# Patient Record
Sex: FEMALE | Race: BLACK OR AFRICAN AMERICAN | Employment: UNEMPLOYED | URBAN - METROPOLITAN AREA
[De-identification: names, ages, dates, MRNs, and addresses within clinical notes are randomized per-mention and may not be internally consistent; named-entity substitution may affect disease eponyms.]

---

## 2017-01-16 ENCOUNTER — APPOINTMENT (OUTPATIENT)
Dept: CT IMAGING | Facility: HOSPITAL | Age: 27
End: 2017-01-16
Attending: EMERGENCY MEDICINE
Payer: MEDICAID

## 2017-01-16 ENCOUNTER — HOSPITAL ENCOUNTER (EMERGENCY)
Facility: HOSPITAL | Age: 27
Discharge: HOME | End: 2017-01-16
Attending: EMERGENCY MEDICINE
Payer: MEDICAID

## 2017-01-16 VITALS
HEIGHT: 64 IN | WEIGHT: 105.81 LBS | SYSTOLIC BLOOD PRESSURE: 124 MMHG | HEART RATE: 52 BPM | RESPIRATION RATE: 18 BRPM | OXYGEN SATURATION: 100 % | TEMPERATURE: 101 F | BODY MASS INDEX: 18.06 KG/M2 | DIASTOLIC BLOOD PRESSURE: 109 MMHG

## 2017-01-16 DIAGNOSIS — R10.9 ABDOMINAL PAIN, ACUTE: Primary | ICD-10-CM

## 2017-01-16 LAB
ALBUMIN SERPL-MCNC: 4.4 G/DL (ref 3.5–4.8)
ALP LIVER SERPL-CCNC: 64 U/L (ref 37–98)
ALT SERPL-CCNC: 17 U/L (ref 14–54)
AST SERPL-CCNC: 13 U/L (ref 15–41)
BASOPHILS # BLD AUTO: 0.01 X10(3) UL (ref 0–0.1)
BASOPHILS NFR BLD AUTO: 0.1 %
BILIRUB SERPL-MCNC: 1.1 MG/DL (ref 0.1–2)
BILIRUB UR QL STRIP.AUTO: NEGATIVE
BUN BLD-MCNC: 14 MG/DL (ref 8–20)
CALCIUM BLD-MCNC: 9.5 MG/DL (ref 8.3–10.3)
CHLORIDE: 99 MMOL/L (ref 101–111)
CLARITY UR REFRACT.AUTO: CLEAR
CO2: 28 MMOL/L (ref 22–32)
COLOR UR AUTO: YELLOW
CREAT BLD-MCNC: 0.9 MG/DL (ref 0.55–1.02)
EOSINOPHIL # BLD AUTO: 0 X10(3) UL (ref 0–0.3)
EOSINOPHIL NFR BLD AUTO: 0 %
ERYTHROCYTE [DISTWIDTH] IN BLOOD BY AUTOMATED COUNT: 13.5 % (ref 11.5–16)
GLUCOSE BLD-MCNC: 95 MG/DL (ref 70–99)
GLUCOSE UR STRIP.AUTO-MCNC: NEGATIVE MG/DL
HCT VFR BLD AUTO: 41.5 % (ref 34–50)
HGB BLD-MCNC: 14.2 G/DL (ref 12–16)
IMMATURE GRANULOCYTE COUNT: 0.04 X10(3) UL (ref 0–1)
IMMATURE GRANULOCYTE RATIO %: 0.4 %
LEUKOCYTE ESTERASE UR QL STRIP.AUTO: NEGATIVE
LIPASE: 103 U/L (ref 73–393)
LYMPHOCYTES # BLD AUTO: 1.12 X10(3) UL (ref 0.9–4)
LYMPHOCYTES NFR BLD AUTO: 11.7 %
M PROTEIN MFR SERPL ELPH: 8.4 G/DL (ref 6.1–8.3)
MCH RBC QN AUTO: 29.6 PG (ref 27–33.2)
MCHC RBC AUTO-ENTMCNC: 34.2 G/DL (ref 31–37)
MCV RBC AUTO: 86.6 FL (ref 81–100)
MONOCYTES # BLD AUTO: 0.8 X10(3) UL (ref 0.1–0.6)
MONOCYTES NFR BLD AUTO: 8.3 %
NEUTROPHIL ABS PRELIM: 7.63 X10 (3) UL (ref 1.3–6.7)
NEUTROPHILS # BLD AUTO: 7.63 X10(3) UL (ref 1.3–6.7)
NEUTROPHILS NFR BLD AUTO: 79.5 %
NITRITE UR QL STRIP.AUTO: NEGATIVE
PH UR STRIP.AUTO: 6 [PH] (ref 4.5–8)
PLATELET # BLD AUTO: 252 10(3)UL (ref 150–450)
POCT LOT NUMBER: NORMAL
POCT URINE PREGNANCY: NEGATIVE
POTASSIUM SERPL-SCNC: 3.5 MMOL/L (ref 3.6–5.1)
PROT UR STRIP.AUTO-MCNC: 100 MG/DL
RBC # BLD AUTO: 4.79 X10(6)UL (ref 3.8–5.1)
RBC UR QL AUTO: NEGATIVE
RED CELL DISTRIBUTION WIDTH-SD: 42.5 FL (ref 35.1–46.3)
SODIUM SERPL-SCNC: 136 MMOL/L (ref 136–144)
SP GR UR STRIP.AUTO: >1.06 (ref 1–1.03)
UROBILINOGEN UR STRIP.AUTO-MCNC: <2 MG/DL
WBC # BLD AUTO: 9.6 X10(3) UL (ref 4–13)

## 2017-01-16 PROCEDURE — 85025 COMPLETE CBC W/AUTO DIFF WBC: CPT | Performed by: EMERGENCY MEDICINE

## 2017-01-16 PROCEDURE — 96374 THER/PROPH/DIAG INJ IV PUSH: CPT

## 2017-01-16 PROCEDURE — 96375 TX/PRO/DX INJ NEW DRUG ADDON: CPT

## 2017-01-16 PROCEDURE — 80053 COMPREHEN METABOLIC PANEL: CPT | Performed by: EMERGENCY MEDICINE

## 2017-01-16 PROCEDURE — 81001 URINALYSIS AUTO W/SCOPE: CPT | Performed by: EMERGENCY MEDICINE

## 2017-01-16 PROCEDURE — 83690 ASSAY OF LIPASE: CPT | Performed by: EMERGENCY MEDICINE

## 2017-01-16 PROCEDURE — 74178 CT ABD&PLV WO CNTR FLWD CNTR: CPT

## 2017-01-16 PROCEDURE — 96361 HYDRATE IV INFUSION ADD-ON: CPT

## 2017-01-16 PROCEDURE — 81025 URINE PREGNANCY TEST: CPT

## 2017-01-16 PROCEDURE — 99285 EMERGENCY DEPT VISIT HI MDM: CPT

## 2017-01-16 PROCEDURE — 99284 EMERGENCY DEPT VISIT MOD MDM: CPT

## 2017-01-16 RX ORDER — ONDANSETRON 2 MG/ML
INJECTION INTRAMUSCULAR; INTRAVENOUS
Status: DISCONTINUED
Start: 2017-01-16 | End: 2017-01-17

## 2017-01-16 RX ORDER — ONDANSETRON 2 MG/ML
4 INJECTION INTRAMUSCULAR; INTRAVENOUS ONCE
Status: COMPLETED | OUTPATIENT
Start: 2017-01-16 | End: 2017-01-16

## 2017-01-16 RX ORDER — MIDAZOLAM HYDROCHLORIDE 1 MG/ML
2.5 INJECTION INTRAMUSCULAR; INTRAVENOUS ONCE
Status: DISCONTINUED | OUTPATIENT
Start: 2017-01-16 | End: 2017-01-16

## 2017-01-16 RX ORDER — ACETAMINOPHEN 500 MG
TABLET ORAL
Status: DISCONTINUED
Start: 2017-01-16 | End: 2017-01-17

## 2017-01-16 RX ORDER — ONDANSETRON 4 MG/1
4 TABLET, ORALLY DISINTEGRATING ORAL EVERY 4 HOURS PRN
Qty: 10 TABLET | Refills: 0 | Status: SHIPPED | OUTPATIENT
Start: 2017-01-16 | End: 2017-06-05

## 2017-01-16 RX ORDER — ACETAMINOPHEN 500 MG
1000 TABLET ORAL ONCE
Status: COMPLETED | OUTPATIENT
Start: 2017-01-16 | End: 2017-01-16

## 2017-01-16 RX ORDER — SODIUM CHLORIDE 9 MG/ML
1000 INJECTION, SOLUTION INTRAVENOUS ONCE
Status: COMPLETED | OUTPATIENT
Start: 2017-01-16 | End: 2017-01-16

## 2017-01-16 RX ORDER — MORPHINE SULFATE 4 MG/ML
4 INJECTION, SOLUTION INTRAMUSCULAR; INTRAVENOUS ONCE
Status: COMPLETED | OUTPATIENT
Start: 2017-01-16 | End: 2017-01-16

## 2017-01-17 NOTE — ED PROVIDER NOTES
I reviewed the CT scan findings urinalysis with the patient. I feel her vomiting no fever no abdominal discomfort may be related to the infectious etiology likely viral in origin.   Patient serial abdominal exams of the patient's abdomen to be soft nonsurg

## 2017-01-17 NOTE — ED PROVIDER NOTES
Patient Seen in: [facility] Emergency Department    History   Patient presents with:  Abdomen/Flank Pain (GI/)    Stated Complaint: abdominal pain    HPI    18-year-old female presents emergency department has had nausea vomiting and diarrhea for fever.  HEENT: Pupils equal react to light extraocular muscles intact no scleral icterus, mucous membranes are dry, there is no erythema or exudate in the posterior pharynx  Neck: Supple no JVD no lymphadenopathy no meningismus no carotid bruit  CV: Regula along some pain medication and IV fluids.   Am concerned about appendicitis     Ct Abdomen+pelvis(all W+wo)(cpt=74178)    1/16/2017  PROCEDURE:  CT ABDOMEN+PELVIS(ALL W+WO)(CPT=74178)  COMPARISON:  [facility] , CT ABDOMEN PELVIS IV CONTRAST, NO ORAL (ER), 8/24/ Normal.  No visible pulmonary or pleural disease. OTHER:  Negative. 1/16/2017  CONCLUSION:   1. 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 2. The appendix is not identified, however there are no pericecal inflammatory change is noted.     Radha Cid

## 2017-06-05 ENCOUNTER — APPOINTMENT (OUTPATIENT)
Dept: ULTRASOUND IMAGING | Facility: HOSPITAL | Age: 27
End: 2017-06-05
Attending: EMERGENCY MEDICINE
Payer: MEDICAID

## 2017-06-05 ENCOUNTER — APPOINTMENT (OUTPATIENT)
Dept: GENERAL RADIOLOGY | Facility: HOSPITAL | Age: 27
End: 2017-06-05
Attending: EMERGENCY MEDICINE
Payer: MEDICAID

## 2017-06-05 ENCOUNTER — HOSPITAL ENCOUNTER (EMERGENCY)
Facility: HOSPITAL | Age: 27
Discharge: HOME | End: 2017-06-05
Attending: EMERGENCY MEDICINE
Payer: MEDICAID

## 2017-06-05 VITALS
SYSTOLIC BLOOD PRESSURE: 151 MMHG | TEMPERATURE: 98 F | WEIGHT: 105 LBS | BODY MASS INDEX: 17.93 KG/M2 | HEART RATE: 53 BPM | RESPIRATION RATE: 19 BRPM | HEIGHT: 64 IN | DIASTOLIC BLOOD PRESSURE: 98 MMHG | OXYGEN SATURATION: 100 %

## 2017-06-05 DIAGNOSIS — R10.9 ABDOMINAL PAIN OF UNKNOWN ETIOLOGY: Primary | ICD-10-CM

## 2017-06-05 DIAGNOSIS — N94.6 DYSMENORRHEA: ICD-10-CM

## 2017-06-05 PROCEDURE — 85025 COMPLETE CBC W/AUTO DIFF WBC: CPT | Performed by: EMERGENCY MEDICINE

## 2017-06-05 PROCEDURE — 74020 XR ABDOMEN, OBSTRUCTIVE SERIES (CPT=74020): CPT | Performed by: EMERGENCY MEDICINE

## 2017-06-05 PROCEDURE — 81001 URINALYSIS AUTO W/SCOPE: CPT | Performed by: EMERGENCY MEDICINE

## 2017-06-05 PROCEDURE — 76830 TRANSVAGINAL US NON-OB: CPT | Performed by: EMERGENCY MEDICINE

## 2017-06-05 PROCEDURE — 81025 URINE PREGNANCY TEST: CPT

## 2017-06-05 PROCEDURE — 76856 US EXAM PELVIC COMPLETE: CPT | Performed by: EMERGENCY MEDICINE

## 2017-06-05 PROCEDURE — 96376 TX/PRO/DX INJ SAME DRUG ADON: CPT

## 2017-06-05 PROCEDURE — 99284 EMERGENCY DEPT VISIT MOD MDM: CPT

## 2017-06-05 PROCEDURE — 96375 TX/PRO/DX INJ NEW DRUG ADDON: CPT

## 2017-06-05 PROCEDURE — 93975 VASCULAR STUDY: CPT | Performed by: EMERGENCY MEDICINE

## 2017-06-05 PROCEDURE — 96361 HYDRATE IV INFUSION ADD-ON: CPT

## 2017-06-05 PROCEDURE — 99285 EMERGENCY DEPT VISIT HI MDM: CPT

## 2017-06-05 PROCEDURE — 76700 US EXAM ABDOM COMPLETE: CPT | Performed by: EMERGENCY MEDICINE

## 2017-06-05 PROCEDURE — 83690 ASSAY OF LIPASE: CPT | Performed by: EMERGENCY MEDICINE

## 2017-06-05 PROCEDURE — 96374 THER/PROPH/DIAG INJ IV PUSH: CPT

## 2017-06-05 PROCEDURE — 80053 COMPREHEN METABOLIC PANEL: CPT | Performed by: EMERGENCY MEDICINE

## 2017-06-05 RX ORDER — KETOROLAC TROMETHAMINE 30 MG/ML
30 INJECTION, SOLUTION INTRAMUSCULAR; INTRAVENOUS ONCE
Status: DISCONTINUED | OUTPATIENT
Start: 2017-06-05 | End: 2017-06-05

## 2017-06-05 RX ORDER — HYDROMORPHONE HYDROCHLORIDE 1 MG/ML
1 INJECTION, SOLUTION INTRAMUSCULAR; INTRAVENOUS; SUBCUTANEOUS ONCE
Status: COMPLETED | OUTPATIENT
Start: 2017-06-05 | End: 2017-06-05

## 2017-06-05 RX ORDER — ONDANSETRON 2 MG/ML
4 INJECTION INTRAMUSCULAR; INTRAVENOUS ONCE
Status: COMPLETED | OUTPATIENT
Start: 2017-06-05 | End: 2017-06-05

## 2017-06-05 RX ORDER — KETOROLAC TROMETHAMINE 30 MG/ML
15 INJECTION, SOLUTION INTRAMUSCULAR; INTRAVENOUS ONCE
Status: COMPLETED | OUTPATIENT
Start: 2017-06-05 | End: 2017-06-05

## 2017-06-05 RX ORDER — HYDROCODONE BITARTRATE AND ACETAMINOPHEN 5; 325 MG/1; MG/1
1 TABLET ORAL EVERY 6 HOURS PRN
Qty: 20 TABLET | Refills: 0 | Status: SHIPPED | OUTPATIENT
Start: 2017-06-05 | End: 2017-06-17

## 2017-06-05 RX ORDER — HYDROCODONE BITARTRATE AND ACETAMINOPHEN 5; 325 MG/1; MG/1
1-2 TABLET ORAL EVERY 4 HOURS PRN
Qty: 20 TABLET | Refills: 0 | Status: SHIPPED | OUTPATIENT
Start: 2017-06-05 | End: 2017-06-05

## 2017-06-05 NOTE — ED NOTES
Patient very tearful and moaning on cart. Pt sts pain and nausea since Saturday. Unable to recall at this time how many episodes of emesis today.

## 2017-06-05 NOTE — ED INITIAL ASSESSMENT (HPI)
Patient reports severe abdominal pain as well as nausea/vomiting since Friday. Reports she has had ovarian cysts in past. Reports she started her menstrual cycle yesterday which made pain worse. Crying in triage d/t pain.

## 2017-06-06 NOTE — ED PROVIDER NOTES
Patient Seen in: [facility] Emergency Department    History   Patient presents with:  Abdomen/Flank Pain (GI/)    Stated Complaint: Abd pain    HPI    68-year-old complaining of abdominal pain and vomiting the patient states she began administerin cardiovascular exam shows regular rate and rhythm without murmurs abdomen is soft there is mild to moderate generalized tenderness throughout the abdomen there is no masses guarding rebound extremities normal neurologic exams normal.    ED Course     Labs questionable ovarian cyst in the past.  I did an ultrasound of her abdomen which was negative ultrasound of her pelvis showed possible pelvic congestion syndrome.   I advised the patient that if she needs to follow-up with gynecology regarding the symptoms

## 2017-09-01 ENCOUNTER — APPOINTMENT (OUTPATIENT)
Dept: ULTRASOUND IMAGING | Facility: HOSPITAL | Age: 27
End: 2017-09-01
Attending: EMERGENCY MEDICINE
Payer: MEDICAID

## 2017-09-01 ENCOUNTER — HOSPITAL ENCOUNTER (EMERGENCY)
Facility: HOSPITAL | Age: 27
Discharge: HOME | End: 2017-09-01
Attending: EMERGENCY MEDICINE
Payer: MEDICAID

## 2017-09-01 VITALS
OXYGEN SATURATION: 100 % | WEIGHT: 110 LBS | BODY MASS INDEX: 18.78 KG/M2 | HEIGHT: 64 IN | HEART RATE: 53 BPM | TEMPERATURE: 99 F | SYSTOLIC BLOOD PRESSURE: 132 MMHG | DIASTOLIC BLOOD PRESSURE: 87 MMHG | RESPIRATION RATE: 16 BRPM

## 2017-09-01 DIAGNOSIS — N30.90 CYSTITIS: Primary | ICD-10-CM

## 2017-09-01 DIAGNOSIS — R10.9 ABDOMINAL PAIN, ACUTE: ICD-10-CM

## 2017-09-01 DIAGNOSIS — N83.202 CYST OF LEFT OVARY: ICD-10-CM

## 2017-09-01 LAB
ALBUMIN SERPL-MCNC: 4.2 G/DL (ref 3.5–4.8)
ALP LIVER SERPL-CCNC: 58 U/L (ref 37–98)
ALT SERPL-CCNC: 19 U/L (ref 14–54)
AST SERPL-CCNC: 15 U/L (ref 15–41)
BASOPHILS # BLD AUTO: 0.01 X10(3) UL (ref 0–0.1)
BASOPHILS NFR BLD AUTO: 0.1 %
BILIRUB SERPL-MCNC: 0.6 MG/DL (ref 0.1–2)
BILIRUB UR QL STRIP.AUTO: NEGATIVE
BUN BLD-MCNC: 12 MG/DL (ref 8–20)
CALCIUM BLD-MCNC: 10.1 MG/DL (ref 8.3–10.3)
CHLORIDE: 104 MMOL/L (ref 101–111)
CO2: 23 MMOL/L (ref 22–32)
COLOR UR AUTO: YELLOW
CREAT BLD-MCNC: 0.81 MG/DL (ref 0.55–1.02)
EOSINOPHIL # BLD AUTO: 0 X10(3) UL (ref 0–0.3)
EOSINOPHIL NFR BLD AUTO: 0 %
ERYTHROCYTE [DISTWIDTH] IN BLOOD BY AUTOMATED COUNT: 12.7 % (ref 11.5–16)
GLUCOSE BLD-MCNC: 103 MG/DL (ref 70–99)
GLUCOSE UR STRIP.AUTO-MCNC: NEGATIVE MG/DL
HCT VFR BLD AUTO: 40.7 % (ref 34–50)
HGB BLD-MCNC: 14.1 G/DL (ref 12–16)
IMMATURE GRANULOCYTE COUNT: 0.02 X10(3) UL (ref 0–1)
IMMATURE GRANULOCYTE RATIO %: 0.2 %
KETONES UR STRIP.AUTO-MCNC: 80 MG/DL
LEUKOCYTE ESTERASE UR QL STRIP.AUTO: NEGATIVE
LYMPHOCYTES # BLD AUTO: 0.99 X10(3) UL (ref 0.9–4)
LYMPHOCYTES NFR BLD AUTO: 10.2 %
M PROTEIN MFR SERPL ELPH: 8.4 G/DL (ref 6.1–8.3)
MCH RBC QN AUTO: 29.8 PG (ref 27–33.2)
MCHC RBC AUTO-ENTMCNC: 34.6 G/DL (ref 31–37)
MCV RBC AUTO: 86 FL (ref 81–100)
MONOCYTES # BLD AUTO: 1.37 X10(3) UL (ref 0.1–0.6)
MONOCYTES NFR BLD AUTO: 14.1 %
NEUTROPHIL ABS PRELIM: 7.32 X10 (3) UL (ref 1.3–6.7)
NEUTROPHILS # BLD AUTO: 7.32 X10(3) UL (ref 1.3–6.7)
NEUTROPHILS NFR BLD AUTO: 75.4 %
NITRITE UR QL STRIP.AUTO: NEGATIVE
PH UR STRIP.AUTO: 6 [PH] (ref 4.5–8)
PLATELET # BLD AUTO: 236 10(3)UL (ref 150–450)
POCT URINE PREGNANCY: NEGATIVE
POTASSIUM SERPL-SCNC: 3.6 MMOL/L (ref 3.6–5.1)
PROT UR STRIP.AUTO-MCNC: 100 MG/DL
RBC # BLD AUTO: 4.73 X10(6)UL (ref 3.8–5.1)
RED CELL DISTRIBUTION WIDTH-SD: 39.9 FL (ref 35.1–46.3)
SODIUM SERPL-SCNC: 136 MMOL/L (ref 136–144)
SP GR UR STRIP.AUTO: 1.03 (ref 1–1.03)
UROBILINOGEN UR STRIP.AUTO-MCNC: <2 MG/DL
WBC # BLD AUTO: 9.7 X10(3) UL (ref 4–13)

## 2017-09-01 PROCEDURE — 80053 COMPREHEN METABOLIC PANEL: CPT | Performed by: EMERGENCY MEDICINE

## 2017-09-01 PROCEDURE — 96372 THER/PROPH/DIAG INJ SC/IM: CPT

## 2017-09-01 PROCEDURE — 76856 US EXAM PELVIC COMPLETE: CPT | Performed by: EMERGENCY MEDICINE

## 2017-09-01 PROCEDURE — 96361 HYDRATE IV INFUSION ADD-ON: CPT

## 2017-09-01 PROCEDURE — 85025 COMPLETE CBC W/AUTO DIFF WBC: CPT | Performed by: EMERGENCY MEDICINE

## 2017-09-01 PROCEDURE — 76830 TRANSVAGINAL US NON-OB: CPT | Performed by: EMERGENCY MEDICINE

## 2017-09-01 PROCEDURE — 99284 EMERGENCY DEPT VISIT MOD MDM: CPT

## 2017-09-01 PROCEDURE — 96374 THER/PROPH/DIAG INJ IV PUSH: CPT

## 2017-09-01 PROCEDURE — 96375 TX/PRO/DX INJ NEW DRUG ADDON: CPT

## 2017-09-01 PROCEDURE — 99285 EMERGENCY DEPT VISIT HI MDM: CPT

## 2017-09-01 PROCEDURE — 81025 URINE PREGNANCY TEST: CPT

## 2017-09-01 PROCEDURE — 93975 VASCULAR STUDY: CPT | Performed by: EMERGENCY MEDICINE

## 2017-09-01 PROCEDURE — 81001 URINALYSIS AUTO W/SCOPE: CPT | Performed by: EMERGENCY MEDICINE

## 2017-09-01 RX ORDER — HYDROMORPHONE HYDROCHLORIDE 1 MG/ML
0.5 INJECTION, SOLUTION INTRAMUSCULAR; INTRAVENOUS; SUBCUTANEOUS ONCE
Status: COMPLETED | OUTPATIENT
Start: 2017-09-01 | End: 2017-09-01

## 2017-09-01 RX ORDER — ONDANSETRON 2 MG/ML
4 INJECTION INTRAMUSCULAR; INTRAVENOUS ONCE
Status: COMPLETED | OUTPATIENT
Start: 2017-09-01 | End: 2017-09-01

## 2017-09-01 RX ORDER — DICYCLOMINE HYDROCHLORIDE 10 MG/ML
20 INJECTION INTRAMUSCULAR ONCE
Status: COMPLETED | OUTPATIENT
Start: 2017-09-01 | End: 2017-09-01

## 2017-09-01 RX ORDER — SODIUM CHLORIDE 9 MG/ML
1000 INJECTION, SOLUTION INTRAVENOUS ONCE
Status: COMPLETED | OUTPATIENT
Start: 2017-09-01 | End: 2017-09-01

## 2017-09-01 RX ORDER — KETOROLAC TROMETHAMINE 30 MG/ML
30 INJECTION, SOLUTION INTRAMUSCULAR; INTRAVENOUS ONCE
Status: COMPLETED | OUTPATIENT
Start: 2017-09-01 | End: 2017-09-01

## 2017-09-01 RX ORDER — NITROFURANTOIN 25; 75 MG/1; MG/1
100 CAPSULE ORAL 2 TIMES DAILY
Qty: 20 CAPSULE | Refills: 0 | Status: SHIPPED | OUTPATIENT
Start: 2017-09-01 | End: 2017-09-11

## 2017-09-01 NOTE — ED INITIAL ASSESSMENT (HPI)
Pt presents to the ed with c/o diffuse abdominal pain for the last 3 days and has not had a bowel movement in the last 4 days and states, \"When I get close to my period I start feeling like this, they told me to go see a gynecologist and I have not seen o

## 2017-09-01 NOTE — ED NOTES
Ultrasound tech called with report that pt is not tolerating procedure well.  Pt is tossing about on cart, unable to lie still, stating she is \"too uncomfortable\" to continue with internal part of exam. Pt to be brought back from [address] [place] [address], Dr. Naty Garcia

## 2017-09-01 NOTE — ED NOTES
Bedside report received from [facility]. Pt medicated per orders, see MAR. Pt instructed to call when bladder feels full, awaiting further orders. Vitals stable.

## 2017-09-01 NOTE — ED NOTES
Pt to [address] now, specimen cup sent with pt for urine collection after US. Pt in no distress.

## 2017-09-01 NOTE — ED PROVIDER NOTES
Patient Seen in: [facility] Emergency Department    History   Patient presents with:  Nausea/Vomiting/Diarrhea (gastrointestinal)    Stated Complaint: nvdr    HPI    Patient is a 68-year-old female who presents emergency room with a history of lower (5' 4\")   Wt 49.9 kg   LMP 08/05/2017   SpO2 100%   BMI 18.88 kg/m²         Physical Exam    GENERAL: Well-developed, well-nourished female sitting up breathing easily in no apparent distress. Patient is nontoxic in appearance.   HEENT: Head is normocepha normal limits   CBC WITH DIFFERENTIAL WITH PLATELET    Narrative: The following orders were created for panel order CBC WITH DIFFERENTIAL WITH PLATELET.   Procedure                               Abnormality         Status                     --------- ovary    Disposition:  Discharge    Follow-up:  MD Esme Hdz [address]  [address] [facility]  [phone]    Call in 2 days  [address] [facility]      Medications Prescribed:  Current Discharge Medication List

## 2017-09-02 NOTE — ED NOTES
Patient is resting comfortably. Talking on cell phone, states she feels ready to return to [address] [place] [address] to complete scan. Pt instructed not to void until after US.

## 2017-09-02 NOTE — ED NOTES
Pt reports pain is resolved since last medication, states US scan \"did not hurt this time around\". Pt is stable and awaiting US results.

## 2017-09-08 ENCOUNTER — TELEPHONE (OUTPATIENT)
Dept: OBGYN CLINIC | Facility: CLINIC | Age: 27
End: 2017-09-08

## 2017-09-13 ENCOUNTER — OFFICE VISIT (OUTPATIENT)
Dept: OBGYN CLINIC | Facility: CLINIC | Age: 27
End: 2017-09-13

## 2017-09-13 VITALS
HEART RATE: 91 BPM | DIASTOLIC BLOOD PRESSURE: 78 MMHG | SYSTOLIC BLOOD PRESSURE: 122 MMHG | WEIGHT: 109 LBS | HEIGHT: 64.25 IN | BODY MASS INDEX: 18.61 KG/M2

## 2017-09-13 DIAGNOSIS — N83.202 LEFT OVARIAN CYST: ICD-10-CM

## 2017-09-13 DIAGNOSIS — N94.6 DYSMENORRHEA: ICD-10-CM

## 2017-09-13 DIAGNOSIS — N92.0 MENORRHAGIA WITH REGULAR CYCLE: ICD-10-CM

## 2017-09-13 DIAGNOSIS — N93.9 ABNORMAL UTERINE BLEEDING (AUB): Primary | ICD-10-CM

## 2017-09-13 PROCEDURE — 99213 OFFICE O/P EST LOW 20 MIN: CPT | Performed by: OBSTETRICS & GYNECOLOGY

## 2017-09-13 RX ORDER — NORETHINDRONE ACETATE AND ETHINYL ESTRADIOL 1MG-20(21)
1 KIT ORAL DAILY
Qty: 1 PACKAGE | Refills: 11 | Status: SHIPPED | OUTPATIENT
Start: 2017-09-13 | End: 2018-03-12

## 2017-09-13 NOTE — PROGRESS NOTES
[address] [facility]  Obstetrics and Gynecology  Follow Up Progress Note    Subjective:     Colleen Dean is a 27year old  female who presents for follow up after ED visit at [facility] on 17.  The patient reports she reported to the ED with complai Pelvic exam:   VULVA: normal appearing vulva with no masses, tenderness or lesions  VAGINA: nontender   CERVIX: no CMT  UTERUS: uterus is normal size, shape, consistency and nontender  ADNEXA: normal adnexa in size, nontender and no masses  Ext: non-tend pelvic US in 3 months   - initiate hormonal therapy for possible decrease risk of developing new ovarian cyst   - ER/torsion/rutpure precautions given     All of the findings and plan were discussed with the patient.   She notes understanding and agrees wit

## 2017-09-13 NOTE — PATIENT INSTRUCTIONS
Please make a follow up appointment in 3 months for a well woman exam or sooner if needed    Please complete a repeat pelvic US prior to your next follow up visit

## 2017-11-21 ENCOUNTER — HOSPITAL ENCOUNTER (EMERGENCY)
Facility: HOSPITAL | Age: 27
Discharge: HOME | End: 2017-11-21
Attending: EMERGENCY MEDICINE
Payer: MEDICAID

## 2017-11-21 VITALS
WEIGHT: 110 LBS | SYSTOLIC BLOOD PRESSURE: 154 MMHG | DIASTOLIC BLOOD PRESSURE: 104 MMHG | BODY MASS INDEX: 19 KG/M2 | HEART RATE: 60 BPM | OXYGEN SATURATION: 100 % | RESPIRATION RATE: 20 BRPM | TEMPERATURE: 99 F

## 2017-11-21 DIAGNOSIS — E87.6 HYPOKALEMIA: ICD-10-CM

## 2017-11-21 DIAGNOSIS — R10.30 LOWER ABDOMINAL PAIN: Primary | ICD-10-CM

## 2017-11-21 DIAGNOSIS — R11.2 NAUSEA AND VOMITING IN ADULT: ICD-10-CM

## 2017-11-21 PROCEDURE — 96361 HYDRATE IV INFUSION ADD-ON: CPT

## 2017-11-21 PROCEDURE — 96375 TX/PRO/DX INJ NEW DRUG ADDON: CPT

## 2017-11-21 PROCEDURE — 99284 EMERGENCY DEPT VISIT MOD MDM: CPT

## 2017-11-21 PROCEDURE — 80053 COMPREHEN METABOLIC PANEL: CPT | Performed by: EMERGENCY MEDICINE

## 2017-11-21 PROCEDURE — 96374 THER/PROPH/DIAG INJ IV PUSH: CPT

## 2017-11-21 PROCEDURE — 81001 URINALYSIS AUTO W/SCOPE: CPT | Performed by: EMERGENCY MEDICINE

## 2017-11-21 PROCEDURE — 83690 ASSAY OF LIPASE: CPT | Performed by: EMERGENCY MEDICINE

## 2017-11-21 PROCEDURE — 85025 COMPLETE CBC W/AUTO DIFF WBC: CPT | Performed by: EMERGENCY MEDICINE

## 2017-11-21 PROCEDURE — 81025 URINE PREGNANCY TEST: CPT

## 2017-11-21 RX ORDER — KETOROLAC TROMETHAMINE 30 MG/ML
30 INJECTION, SOLUTION INTRAMUSCULAR; INTRAVENOUS ONCE
Status: COMPLETED | OUTPATIENT
Start: 2017-11-21 | End: 2017-11-21

## 2017-11-21 RX ORDER — MORPHINE SULFATE 4 MG/ML
2 INJECTION, SOLUTION INTRAMUSCULAR; INTRAVENOUS ONCE
Status: COMPLETED | OUTPATIENT
Start: 2017-11-21 | End: 2017-11-21

## 2017-11-21 RX ORDER — ONDANSETRON 2 MG/ML
4 INJECTION INTRAMUSCULAR; INTRAVENOUS ONCE
Status: COMPLETED | OUTPATIENT
Start: 2017-11-21 | End: 2017-11-21

## 2017-11-21 RX ORDER — POTASSIUM CHLORIDE 20 MEQ/1
40 TABLET, EXTENDED RELEASE ORAL ONCE
Status: COMPLETED | OUTPATIENT
Start: 2017-11-21 | End: 2017-11-21

## 2017-11-21 RX ORDER — ONDANSETRON 4 MG/1
4 TABLET, ORALLY DISINTEGRATING ORAL EVERY 4 HOURS PRN
Qty: 10 TABLET | Refills: 0 | Status: SHIPPED | OUTPATIENT
Start: 2017-11-21 | End: 2017-11-28

## 2017-11-21 NOTE — ED PROVIDER NOTES
Patient Seen in: [facility] Emergency Department    History   Patient presents with:  Abdomen/Flank Pain (GI/)  Nausea/Vomiting/Diarrhea (gastrointestinal)    Stated Complaint: vomiting, abdominal pain since Saturday.   hx of the same, cyst to left to person, place, and time. Appears well-developed and well-nourished. Head: Normocephalic and atraumatic. Nose: Nose normal.   Eyes: EOM are normal. Pupils are equal, round, and reactive to light. Neck: Normal range of motion. Neck supple.  No JVD pr DIFFERENTIAL[[id number]]          Abnormal            Final result                 Please view results for these tests on the individual orders.    POCT PREGNANCY, URINE       ED Course as of Nov 21 1740  -----------------------------------------------------

## 2018-03-12 ENCOUNTER — HOSPITAL ENCOUNTER (EMERGENCY)
Facility: HOSPITAL | Age: 28
Discharge: HOME | End: 2018-03-12
Attending: EMERGENCY MEDICINE
Payer: MEDICAID

## 2018-03-12 ENCOUNTER — TELEPHONE (OUTPATIENT)
Dept: OBGYN CLINIC | Facility: CLINIC | Age: 28
End: 2018-03-12

## 2018-03-12 ENCOUNTER — APPOINTMENT (OUTPATIENT)
Dept: ULTRASOUND IMAGING | Facility: HOSPITAL | Age: 28
End: 2018-03-12
Attending: EMERGENCY MEDICINE
Payer: MEDICAID

## 2018-03-12 VITALS
HEART RATE: 60 BPM | TEMPERATURE: 98 F | WEIGHT: 105 LBS | DIASTOLIC BLOOD PRESSURE: 82 MMHG | RESPIRATION RATE: 18 BRPM | SYSTOLIC BLOOD PRESSURE: 138 MMHG | HEIGHT: 64 IN | BODY MASS INDEX: 17.93 KG/M2 | OXYGEN SATURATION: 100 %

## 2018-03-12 DIAGNOSIS — N83.201 RIGHT OVARIAN CYST: Primary | ICD-10-CM

## 2018-03-12 LAB
ALBUMIN SERPL-MCNC: 4.4 G/DL (ref 3.5–4.8)
ALP LIVER SERPL-CCNC: 54 U/L (ref 37–98)
ALT SERPL-CCNC: 11 U/L (ref 14–54)
AST SERPL-CCNC: 12 U/L (ref 15–41)
BASOPHILS # BLD AUTO: 0.01 X10(3) UL (ref 0–0.1)
BASOPHILS NFR BLD AUTO: 0.1 %
BILIRUB SERPL-MCNC: 0.7 MG/DL (ref 0.1–2)
BILIRUB UR QL STRIP.AUTO: NEGATIVE
BUN BLD-MCNC: 11 MG/DL (ref 8–20)
CALCIUM BLD-MCNC: 9.3 MG/DL (ref 8.3–10.3)
CHLORIDE: 108 MMOL/L (ref 101–111)
CLARITY UR REFRACT.AUTO: CLEAR
CO2: 23 MMOL/L (ref 22–32)
COLOR UR AUTO: YELLOW
CREAT BLD-MCNC: 0.87 MG/DL (ref 0.55–1.02)
EOSINOPHIL # BLD AUTO: 0 X10(3) UL (ref 0–0.3)
EOSINOPHIL NFR BLD AUTO: 0 %
ERYTHROCYTE [DISTWIDTH] IN BLOOD BY AUTOMATED COUNT: 12.8 % (ref 11.5–16)
GLUCOSE BLD-MCNC: 119 MG/DL (ref 70–99)
GLUCOSE UR STRIP.AUTO-MCNC: NEGATIVE MG/DL
HCT VFR BLD AUTO: 36.8 % (ref 34–50)
HGB BLD-MCNC: 12.7 G/DL (ref 12–16)
IMMATURE GRANULOCYTE COUNT: 0.02 X10(3) UL (ref 0–1)
IMMATURE GRANULOCYTE RATIO %: 0.2 %
KETONES UR STRIP.AUTO-MCNC: 80 MG/DL
LEUKOCYTE ESTERASE UR QL STRIP.AUTO: NEGATIVE
LIPASE: 71 U/L (ref 73–393)
LYMPHOCYTES # BLD AUTO: 1.14 X10(3) UL (ref 0.9–4)
LYMPHOCYTES NFR BLD AUTO: 13.8 %
M PROTEIN MFR SERPL ELPH: 7.5 G/DL (ref 6.1–8.3)
MCH RBC QN AUTO: 30.6 PG (ref 27–33.2)
MCHC RBC AUTO-ENTMCNC: 34.5 G/DL (ref 31–37)
MCV RBC AUTO: 88.7 FL (ref 81–100)
MONOCYTES # BLD AUTO: 0.44 X10(3) UL (ref 0.1–1)
MONOCYTES NFR BLD AUTO: 5.3 %
NEUTROPHIL ABS PRELIM: 6.65 X10 (3) UL (ref 1.3–6.7)
NEUTROPHILS # BLD AUTO: 6.65 X10(3) UL (ref 1.3–6.7)
NEUTROPHILS NFR BLD AUTO: 80.6 %
NITRITE UR QL STRIP.AUTO: NEGATIVE
PH UR STRIP.AUTO: 7 [PH] (ref 4.5–8)
PLATELET # BLD AUTO: 217 10(3)UL (ref 150–450)
POCT LOT NUMBER: NORMAL
POCT LOT NUMBER: NORMAL
POCT URINE PREGNANCY: NEGATIVE
POCT URINE PREGNANCY: NEGATIVE
POTASSIUM SERPL-SCNC: 3.6 MMOL/L (ref 3.6–5.1)
PROT UR STRIP.AUTO-MCNC: 30 MG/DL
RBC # BLD AUTO: 4.15 X10(6)UL (ref 3.8–5.1)
RBC UR QL AUTO: NEGATIVE
RED CELL DISTRIBUTION WIDTH-SD: 41.8 FL (ref 35.1–46.3)
SODIUM SERPL-SCNC: 141 MMOL/L (ref 136–144)
SP GR UR STRIP.AUTO: 1.02 (ref 1–1.03)
UROBILINOGEN UR STRIP.AUTO-MCNC: <2 MG/DL
WBC # BLD AUTO: 8.3 X10(3) UL (ref 4–13)

## 2018-03-12 PROCEDURE — 96374 THER/PROPH/DIAG INJ IV PUSH: CPT

## 2018-03-12 PROCEDURE — 87591 N.GONORRHOEAE DNA AMP PROB: CPT | Performed by: EMERGENCY MEDICINE

## 2018-03-12 PROCEDURE — 96376 TX/PRO/DX INJ SAME DRUG ADON: CPT

## 2018-03-12 PROCEDURE — 87660 TRICHOMONAS VAGIN DIR PROBE: CPT | Performed by: EMERGENCY MEDICINE

## 2018-03-12 PROCEDURE — 80053 COMPREHEN METABOLIC PANEL: CPT

## 2018-03-12 PROCEDURE — 87480 CANDIDA DNA DIR PROBE: CPT | Performed by: EMERGENCY MEDICINE

## 2018-03-12 PROCEDURE — 87491 CHLMYD TRACH DNA AMP PROBE: CPT | Performed by: EMERGENCY MEDICINE

## 2018-03-12 PROCEDURE — 80053 COMPREHEN METABOLIC PANEL: CPT | Performed by: EMERGENCY MEDICINE

## 2018-03-12 PROCEDURE — 76830 TRANSVAGINAL US NON-OB: CPT | Performed by: EMERGENCY MEDICINE

## 2018-03-12 PROCEDURE — 76856 US EXAM PELVIC COMPLETE: CPT | Performed by: EMERGENCY MEDICINE

## 2018-03-12 PROCEDURE — 99284 EMERGENCY DEPT VISIT MOD MDM: CPT

## 2018-03-12 PROCEDURE — 96361 HYDRATE IV INFUSION ADD-ON: CPT

## 2018-03-12 PROCEDURE — 93975 VASCULAR STUDY: CPT | Performed by: EMERGENCY MEDICINE

## 2018-03-12 PROCEDURE — 85025 COMPLETE CBC W/AUTO DIFF WBC: CPT

## 2018-03-12 PROCEDURE — 87510 GARDNER VAG DNA DIR PROBE: CPT | Performed by: EMERGENCY MEDICINE

## 2018-03-12 PROCEDURE — 85025 COMPLETE CBC W/AUTO DIFF WBC: CPT | Performed by: EMERGENCY MEDICINE

## 2018-03-12 PROCEDURE — 96375 TX/PRO/DX INJ NEW DRUG ADDON: CPT

## 2018-03-12 PROCEDURE — 83690 ASSAY OF LIPASE: CPT

## 2018-03-12 PROCEDURE — 81025 URINE PREGNANCY TEST: CPT

## 2018-03-12 PROCEDURE — 81001 URINALYSIS AUTO W/SCOPE: CPT | Performed by: EMERGENCY MEDICINE

## 2018-03-12 PROCEDURE — 83690 ASSAY OF LIPASE: CPT | Performed by: EMERGENCY MEDICINE

## 2018-03-12 RX ORDER — ONDANSETRON 4 MG/1
4 TABLET, ORALLY DISINTEGRATING ORAL EVERY 4 HOURS PRN
Qty: 10 TABLET | Refills: 0 | Status: SHIPPED | OUTPATIENT
Start: 2018-03-12 | End: 2018-03-13

## 2018-03-12 RX ORDER — LISINOPRIL 30 MG/1
30 TABLET ORAL DAILY
COMMUNITY
End: 2018-03-12 | Stop reason: CLARIF

## 2018-03-12 RX ORDER — MORPHINE SULFATE 4 MG/ML
INJECTION, SOLUTION INTRAMUSCULAR; INTRAVENOUS
Status: COMPLETED
Start: 2018-03-12 | End: 2018-03-12

## 2018-03-12 RX ORDER — MORPHINE SULFATE 4 MG/ML
4 INJECTION, SOLUTION INTRAMUSCULAR; INTRAVENOUS ONCE
Status: COMPLETED | OUTPATIENT
Start: 2018-03-12 | End: 2018-03-12

## 2018-03-12 RX ORDER — ONDANSETRON 2 MG/ML
INJECTION INTRAMUSCULAR; INTRAVENOUS
Status: DISCONTINUED
Start: 2018-03-12 | End: 2018-03-12

## 2018-03-12 RX ORDER — HYDROCODONE BITARTRATE AND ACETAMINOPHEN 5; 325 MG/1; MG/1
1-2 TABLET ORAL EVERY 4 HOURS PRN
Qty: 20 TABLET | Refills: 0 | Status: ON HOLD | OUTPATIENT
Start: 2018-03-12 | End: 2018-03-19

## 2018-03-12 RX ORDER — ONDANSETRON 2 MG/ML
4 INJECTION INTRAMUSCULAR; INTRAVENOUS ONCE
Status: COMPLETED | OUTPATIENT
Start: 2018-03-12 | End: 2018-03-12

## 2018-03-12 NOTE — ED PROVIDER NOTES
Patient Seen in: [facility] Emergency Department    History   Patient presents with:  Abdomen/Flank Pain (GI/)  Eval-G (gynecologic)    Stated Complaint: abd pain    HPI  Presents with 24 hour history of left lower quadrant pain.   Patient states t SpO2 100%   BMI 18.02 kg/m²         Physical Exam   Constitutional: She is oriented to person, place, and time. She appears well-developed and well-nourished. She appears distressed.    tearful   HENT:   Mouth/Throat: Oropharynx is clear and moist.   Eyes: results for these tests on the individual orders.    POCT PREGNANCY, URINE   POCT PREGNANCY, URINE   RAINBOW DRAW BLUE   RAINBOW DRAW LAVENDER   RAINBOW DRAW LIGHT GREEN   RAINBOW DRAW GOLD   VAGINITIS/VAGINOSIS, DNA PROBE   CHLAMYDIA/GONOCOCCUS, RUIZ   CBC normal limits. OTHER:  Negative. CONCLUSION:  Small probable hemorrhagic cyst right ovary. No sign of torsion. Small free fluid.     Dictated by: Louise Rodriguez MD on 3/12/2018 at 17:30     Approved by: Louise Rodriguez MD            ED Course as of

## 2018-03-12 NOTE — ED NOTES
Pt resting comfortably - pt mother is at bedside. Pt is waiting to go to [facility].  Pt and family aware of POC

## 2018-03-12 NOTE — ED NOTES
Pt was given urine cup prior to going to [address] - pt is aware urine sample needs to be obtained

## 2018-03-12 NOTE — TELEPHONE ENCOUNTER
33 y/o whose mother called regarding her N/V and abdominal pain with her menses. She states it is severe. She was last seen in September 2017 and was advised to f/u in 3 months and repeat US.   Last OV date: 09/13/2017  Recent Test/Labs: US on 09/01/17  Rec

## 2018-03-12 NOTE — TELEPHONE ENCOUNTER
Pt has been going to ER and is significant pain    Looks like Pt was supposed to FU and also get US done    Please call mom

## 2018-03-13 ENCOUNTER — OFFICE VISIT (OUTPATIENT)
Dept: OBGYN CLINIC | Facility: CLINIC | Age: 28
End: 2018-03-13

## 2018-03-13 VITALS
HEART RATE: 69 BPM | DIASTOLIC BLOOD PRESSURE: 74 MMHG | WEIGHT: 109 LBS | SYSTOLIC BLOOD PRESSURE: 110 MMHG | BODY MASS INDEX: 18.61 KG/M2 | HEIGHT: 64 IN

## 2018-03-13 DIAGNOSIS — R10.2 PELVIC PAIN IN FEMALE: ICD-10-CM

## 2018-03-13 DIAGNOSIS — N94.6 DYSMENORRHEA: Primary | ICD-10-CM

## 2018-03-13 DIAGNOSIS — N94.3 PMS (PREMENSTRUAL SYNDROME): ICD-10-CM

## 2018-03-13 LAB
C TRACH DNA SPEC QL NAA+PROBE: NEGATIVE
N GONORRHOEA DNA SPEC QL NAA+PROBE: NEGATIVE

## 2018-03-13 PROCEDURE — 99213 OFFICE O/P EST LOW 20 MIN: CPT | Performed by: NURSE PRACTITIONER

## 2018-03-13 RX ORDER — ONDANSETRON 4 MG/1
4 TABLET, ORALLY DISINTEGRATING ORAL
Qty: 30 TABLET | Refills: 0 | Status: SHIPPED | OUTPATIENT
Start: 2018-03-13 | End: 2018-03-20

## 2018-03-13 RX ORDER — CLINDAMYCIN PHOSPHATE 20 MG/G
1 CREAM VAGINAL NIGHTLY
Qty: 40 G | Refills: 0 | Status: SHIPPED | OUTPATIENT
Start: 2018-03-13 | End: 2018-03-20

## 2018-03-13 RX ORDER — METRONIDAZOLE 500 MG/1
500 TABLET ORAL 2 TIMES DAILY
Qty: 14 TABLET | Refills: 0 | Status: SHIPPED | OUTPATIENT
Start: 2018-03-13 | End: 2018-03-20

## 2018-03-13 RX ORDER — DESOGESTREL AND ETHINYL ESTRADIOL 21-5 (28)
1 KIT ORAL DAILY
Qty: 28 TABLET | Refills: 2 | Status: SHIPPED | OUTPATIENT
Start: 2018-03-13 | End: 2018-04-10

## 2018-03-13 NOTE — PROGRESS NOTES
Gyne note     S: patient is a 32year old yo [id number] here for an ER follow up after being seen yesterday for severe pelvic pain with nausea and vomiting. She has had similar visits to the ER in the past, was seen here last 9/2017 after another ER visit.  Pe motion     O:/74 (BP Location: Left arm, Patient Position: Sitting, Cuff Size: adult)   Pulse 69   Ht 64\"   Wt 109 lb   LMP 02/12/2018 (Exact Date)   BMI 18.71 kg/m²   Gen Tearful, in distress   GYNE/: External Genitalia: Normal appearing, no lesi

## 2018-03-16 ENCOUNTER — HOSPITAL ENCOUNTER (EMERGENCY)
Facility: HOSPITAL | Age: 28
Discharge: HOME | End: 2018-03-16
Attending: EMERGENCY MEDICINE
Payer: MEDICAID

## 2018-03-16 VITALS
SYSTOLIC BLOOD PRESSURE: 150 MMHG | OXYGEN SATURATION: 99 % | HEIGHT: 64 IN | BODY MASS INDEX: 18.61 KG/M2 | TEMPERATURE: 99 F | HEART RATE: 66 BPM | WEIGHT: 109 LBS | RESPIRATION RATE: 15 BRPM | DIASTOLIC BLOOD PRESSURE: 94 MMHG

## 2018-03-16 DIAGNOSIS — R10.9 ABDOMINAL PAIN OF UNKNOWN ETIOLOGY: ICD-10-CM

## 2018-03-16 DIAGNOSIS — N94.6 DYSMENORRHEA: Primary | ICD-10-CM

## 2018-03-16 LAB
ALBUMIN SERPL-MCNC: 4.1 G/DL (ref 3.5–4.8)
ALP LIVER SERPL-CCNC: 55 U/L (ref 37–98)
ALT SERPL-CCNC: 14 U/L (ref 14–54)
AST SERPL-CCNC: 11 U/L (ref 15–41)
BASOPHILS # BLD AUTO: 0.02 X10(3) UL (ref 0–0.1)
BASOPHILS NFR BLD AUTO: 0.2 %
BILIRUB SERPL-MCNC: 0.8 MG/DL (ref 0.1–2)
BUN BLD-MCNC: 8 MG/DL (ref 8–20)
CALCIUM BLD-MCNC: 9.1 MG/DL (ref 8.3–10.3)
CHLORIDE: 106 MMOL/L (ref 101–111)
CO2: 23 MMOL/L (ref 22–32)
CREAT BLD-MCNC: 0.86 MG/DL (ref 0.55–1.02)
EOSINOPHIL # BLD AUTO: 0 X10(3) UL (ref 0–0.3)
EOSINOPHIL NFR BLD AUTO: 0 %
ERYTHROCYTE [DISTWIDTH] IN BLOOD BY AUTOMATED COUNT: 12.5 % (ref 11.5–16)
GLUCOSE BLD-MCNC: 105 MG/DL (ref 70–99)
HCT VFR BLD AUTO: 38.2 % (ref 34–50)
HGB BLD-MCNC: 13.4 G/DL (ref 12–16)
IMMATURE GRANULOCYTE COUNT: 0.03 X10(3) UL (ref 0–1)
IMMATURE GRANULOCYTE RATIO %: 0.3 %
LYMPHOCYTES # BLD AUTO: 1.05 X10(3) UL (ref 0.9–4)
LYMPHOCYTES NFR BLD AUTO: 9.6 %
M PROTEIN MFR SERPL ELPH: 7.5 G/DL (ref 6.1–8.3)
MCH RBC QN AUTO: 30.8 PG (ref 27–33.2)
MCHC RBC AUTO-ENTMCNC: 35.1 G/DL (ref 31–37)
MCV RBC AUTO: 87.8 FL (ref 81–100)
MONOCYTES # BLD AUTO: 0.33 X10(3) UL (ref 0.1–1)
MONOCYTES NFR BLD AUTO: 3 %
NEUTROPHIL ABS PRELIM: 9.55 X10 (3) UL (ref 1.3–6.7)
NEUTROPHILS # BLD AUTO: 9.55 X10(3) UL (ref 1.3–6.7)
NEUTROPHILS NFR BLD AUTO: 86.9 %
PLATELET # BLD AUTO: 269 10(3)UL (ref 150–450)
POTASSIUM SERPL-SCNC: 3.5 MMOL/L (ref 3.6–5.1)
RBC # BLD AUTO: 4.35 X10(6)UL (ref 3.8–5.1)
RED CELL DISTRIBUTION WIDTH-SD: 40.6 FL (ref 35.1–46.3)
SODIUM SERPL-SCNC: 138 MMOL/L (ref 136–144)
WBC # BLD AUTO: 11 X10(3) UL (ref 4–13)

## 2018-03-16 PROCEDURE — 80053 COMPREHEN METABOLIC PANEL: CPT | Performed by: EMERGENCY MEDICINE

## 2018-03-16 PROCEDURE — 99284 EMERGENCY DEPT VISIT MOD MDM: CPT

## 2018-03-16 PROCEDURE — 96375 TX/PRO/DX INJ NEW DRUG ADDON: CPT

## 2018-03-16 PROCEDURE — 96374 THER/PROPH/DIAG INJ IV PUSH: CPT

## 2018-03-16 PROCEDURE — 96361 HYDRATE IV INFUSION ADD-ON: CPT

## 2018-03-16 PROCEDURE — 85025 COMPLETE CBC W/AUTO DIFF WBC: CPT | Performed by: EMERGENCY MEDICINE

## 2018-03-16 RX ORDER — KETOROLAC TROMETHAMINE 30 MG/ML
30 INJECTION, SOLUTION INTRAMUSCULAR; INTRAVENOUS ONCE
Status: COMPLETED | OUTPATIENT
Start: 2018-03-16 | End: 2018-03-16

## 2018-03-16 RX ORDER — DIPHENHYDRAMINE HYDROCHLORIDE 50 MG/ML
25 INJECTION INTRAMUSCULAR; INTRAVENOUS ONCE
Status: COMPLETED | OUTPATIENT
Start: 2018-03-16 | End: 2018-03-16

## 2018-03-16 RX ORDER — ONDANSETRON 2 MG/ML
4 INJECTION INTRAMUSCULAR; INTRAVENOUS ONCE
Status: COMPLETED | OUTPATIENT
Start: 2018-03-16 | End: 2018-03-16

## 2018-03-16 RX ORDER — DICYCLOMINE HCL 20 MG
20 TABLET ORAL 4 TIMES DAILY PRN
Qty: 30 TABLET | Refills: 0 | Status: SHIPPED | OUTPATIENT
Start: 2018-03-16 | End: 2018-04-15

## 2018-03-16 RX ORDER — ONDANSETRON 4 MG/1
4 TABLET, ORALLY DISINTEGRATING ORAL EVERY 4 HOURS PRN
Qty: 20 TABLET | Refills: 0 | Status: SHIPPED | OUTPATIENT
Start: 2018-03-16

## 2018-03-16 NOTE — ED NOTES
Patient provided with paper scrub pants. Patient discharge instructions explained and questions answered.

## 2018-03-16 NOTE — ED INITIAL ASSESSMENT (HPI)
31 y/o female to ED with c/o of nausea, vomiting, and abdominal pain. Patient was seen Monday in ED, was dx with right ovarian cyst, and vaginitis.  Patient was prescribed abx due to having \"infected ovarian cyst\", patient reports to not taking medication

## 2018-03-16 NOTE — ED PROVIDER NOTES
Patient Seen in: [facility] Emergency Department    History   Patient presents with:  Abdomen/Flank Pain (GI/)    Stated Complaint: ABDOMINAL PAIN    HPI    This is a 43-year-old female who arrives here with complaints of abdominal pain.   The erica [03/16/18 1617]  O2 Device: None (Room air) [03/16/18 1617]    Current:/94   Pulse 66   Temp 98.9 °F (37.2 °C) (Temporal)   Resp 15   Ht 162.6 cm (5' 4\")   Wt 49.4 kg   LMP 02/14/2018   SpO2 99%   BMI 18.71 kg/m²         Physical Exam    General: . for these tests on the individual orders.    RAINBOW DRAW BLUE   RAINBOW DRAW LAVENDER   RAINBOW DRAW LIGHT GREEN   RAINBOW DRAW GOLD       ED Course as of Mar 16 1813  ------------------------------------------------------------       MDM       The patient tablet (20 mg total) by mouth 4 (four) times daily as needed. Qty: 30 tablet Refills: 0    !! ondansetron 4 MG Oral Tablet Dispersible  Take 1 tablet (4 mg total) by mouth every 4 (four) hours as needed for Nausea.   Qty: 20 tablet Refills: 0    !! Joséu Parish

## 2018-03-18 ENCOUNTER — HOSPITAL ENCOUNTER (OUTPATIENT)
Facility: HOSPITAL | Age: 28
Setting detail: OBSERVATION
Discharge: HOME | DRG: 759 | End: 2018-03-19
Attending: EMERGENCY MEDICINE | Admitting: HOSPITALIST
Payer: MEDICAID

## 2018-03-18 DIAGNOSIS — N94.6 DYSMENORRHEA: ICD-10-CM

## 2018-03-18 DIAGNOSIS — R11.2 INTRACTABLE VOMITING WITH NAUSEA, UNSPECIFIED VOMITING TYPE: Primary | ICD-10-CM

## 2018-03-18 PROBLEM — E87.6 HYPOKALEMIA: Status: ACTIVE | Noted: 2018-03-18

## 2018-03-18 LAB
ALBUMIN SERPL-MCNC: 4.3 G/DL (ref 3.5–4.8)
ALP LIVER SERPL-CCNC: 55 U/L (ref 37–98)
ALT SERPL-CCNC: 13 U/L (ref 14–54)
AST SERPL-CCNC: 9 U/L (ref 15–41)
BASOPHILS # BLD AUTO: 0.02 X10(3) UL (ref 0–0.1)
BASOPHILS NFR BLD AUTO: 0.2 %
BILIRUB SERPL-MCNC: 1.2 MG/DL (ref 0.1–2)
BILIRUB UR QL STRIP.AUTO: NEGATIVE
BUN BLD-MCNC: 12 MG/DL (ref 8–20)
BUN BLD-MCNC: 12 MG/DL (ref 8–20)
CALCIUM BLD-MCNC: 9 MG/DL (ref 8.3–10.3)
CALCIUM BLD-MCNC: 9 MG/DL (ref 8.3–10.3)
CHLORIDE: 101 MMOL/L (ref 101–111)
CHLORIDE: 101 MMOL/L (ref 101–111)
CO2: 23 MMOL/L (ref 22–32)
CO2: 23 MMOL/L (ref 22–32)
COLOR UR AUTO: YELLOW
CREAT BLD-MCNC: 0.79 MG/DL (ref 0.55–1.02)
CREAT BLD-MCNC: 0.79 MG/DL (ref 0.55–1.02)
EOSINOPHIL # BLD AUTO: 0 X10(3) UL (ref 0–0.3)
EOSINOPHIL NFR BLD AUTO: 0 %
ERYTHROCYTE [DISTWIDTH] IN BLOOD BY AUTOMATED COUNT: 12.4 % (ref 11.5–16)
GLUCOSE BLD-MCNC: 96 MG/DL (ref 70–99)
GLUCOSE BLD-MCNC: 96 MG/DL (ref 70–99)
GLUCOSE UR STRIP.AUTO-MCNC: NEGATIVE MG/DL
HCG URINE QUALITATIVE: NEGATIVE
HCT VFR BLD AUTO: 36.8 % (ref 34–50)
HGB BLD-MCNC: 13.1 G/DL (ref 12–16)
HYALINE CASTS #/AREA URNS AUTO: PRESENT /LPF
IMMATURE GRANULOCYTE COUNT: 0.03 X10(3) UL (ref 0–1)
IMMATURE GRANULOCYTE RATIO %: 0.3 %
KETONES UR STRIP.AUTO-MCNC: 20 MG/DL
LEUKOCYTE ESTERASE UR QL STRIP.AUTO: NEGATIVE
LIPASE: 72 U/L (ref 73–393)
LYMPHOCYTES # BLD AUTO: 1.53 X10(3) UL (ref 0.9–4)
LYMPHOCYTES NFR BLD AUTO: 13.2 %
M PROTEIN MFR SERPL ELPH: 7.5 G/DL (ref 6.1–8.3)
MCH RBC QN AUTO: 30.3 PG (ref 27–33.2)
MCHC RBC AUTO-ENTMCNC: 35.6 G/DL (ref 31–37)
MCV RBC AUTO: 85.2 FL (ref 81–100)
MONOCYTES # BLD AUTO: 0.76 X10(3) UL (ref 0.1–1)
MONOCYTES NFR BLD AUTO: 6.6 %
NEUTROPHIL ABS PRELIM: 9.22 X10 (3) UL (ref 1.3–6.7)
NEUTROPHILS # BLD AUTO: 9.22 X10(3) UL (ref 1.3–6.7)
NEUTROPHILS NFR BLD AUTO: 79.7 %
NITRITE UR QL STRIP.AUTO: NEGATIVE
PH UR STRIP.AUTO: 6 [PH] (ref 4.5–8)
PLATELET # BLD AUTO: 273 10(3)UL (ref 150–450)
POTASSIUM SERPL-SCNC: 3.4 MMOL/L (ref 3.6–5.1)
POTASSIUM SERPL-SCNC: 3.4 MMOL/L (ref 3.6–5.1)
PROT UR STRIP.AUTO-MCNC: 30 MG/DL
RBC # BLD AUTO: 4.32 X10(6)UL (ref 3.8–5.1)
RBC #/AREA URNS AUTO: >10 /HPF
RED CELL DISTRIBUTION WIDTH-SD: 38.5 FL (ref 35.1–46.3)
SODIUM SERPL-SCNC: 136 MMOL/L (ref 136–144)
SODIUM SERPL-SCNC: 136 MMOL/L (ref 136–144)
SP GR UR STRIP.AUTO: 1.02 (ref 1–1.03)
UROBILINOGEN UR STRIP.AUTO-MCNC: 4 MG/DL
WBC # BLD AUTO: 11.6 X10(3) UL (ref 4–13)

## 2018-03-18 PROCEDURE — 99222 1ST HOSP IP/OBS MODERATE 55: CPT | Performed by: HOSPITALIST

## 2018-03-18 RX ORDER — KETOROLAC TROMETHAMINE 30 MG/ML
15 INJECTION, SOLUTION INTRAMUSCULAR; INTRAVENOUS ONCE
Status: COMPLETED | OUTPATIENT
Start: 2018-03-18 | End: 2018-03-18

## 2018-03-18 RX ORDER — HYDROCODONE BITARTRATE AND ACETAMINOPHEN 5; 325 MG/1; MG/1
1 TABLET ORAL EVERY 4 HOURS PRN
Status: DISCONTINUED | OUTPATIENT
Start: 2018-03-18 | End: 2018-03-19

## 2018-03-18 RX ORDER — ONDANSETRON 2 MG/ML
8 INJECTION INTRAMUSCULAR; INTRAVENOUS EVERY 6 HOURS PRN
Status: DISCONTINUED | OUTPATIENT
Start: 2018-03-18 | End: 2018-03-19

## 2018-03-18 RX ORDER — MORPHINE SULFATE 4 MG/ML
4 INJECTION, SOLUTION INTRAMUSCULAR; INTRAVENOUS ONCE
Status: COMPLETED | OUTPATIENT
Start: 2018-03-18 | End: 2018-03-18

## 2018-03-18 RX ORDER — ACETAMINOPHEN 325 MG/1
650 TABLET ORAL EVERY 4 HOURS PRN
Status: DISCONTINUED | OUTPATIENT
Start: 2018-03-18 | End: 2018-03-19

## 2018-03-18 RX ORDER — MORPHINE SULFATE 4 MG/ML
2 INJECTION, SOLUTION INTRAMUSCULAR; INTRAVENOUS
Status: DISCONTINUED | OUTPATIENT
Start: 2018-03-18 | End: 2018-03-19

## 2018-03-18 RX ORDER — TRAZODONE HYDROCHLORIDE 50 MG/1
50 TABLET ORAL NIGHTLY PRN
Status: DISCONTINUED | OUTPATIENT
Start: 2018-03-18 | End: 2018-03-19

## 2018-03-18 RX ORDER — ONDANSETRON 2 MG/ML
4 INJECTION INTRAMUSCULAR; INTRAVENOUS ONCE
Status: COMPLETED | OUTPATIENT
Start: 2018-03-18 | End: 2018-03-18

## 2018-03-18 RX ORDER — SODIUM CHLORIDE 9 MG/ML
INJECTION, SOLUTION INTRAVENOUS CONTINUOUS
Status: DISCONTINUED | OUTPATIENT
Start: 2018-03-18 | End: 2018-03-19

## 2018-03-18 RX ORDER — HYDROCODONE BITARTRATE AND ACETAMINOPHEN 5; 325 MG/1; MG/1
2 TABLET ORAL EVERY 4 HOURS PRN
Status: DISCONTINUED | OUTPATIENT
Start: 2018-03-18 | End: 2018-03-19

## 2018-03-18 RX ORDER — METRONIDAZOLE 500 MG/100ML
500 INJECTION, SOLUTION INTRAVENOUS EVERY 8 HOURS
Status: DISCONTINUED | OUTPATIENT
Start: 2018-03-18 | End: 2018-03-19

## 2018-03-18 RX ORDER — KETOROLAC TROMETHAMINE 15 MG/ML
30 INJECTION, SOLUTION INTRAMUSCULAR; INTRAVENOUS EVERY 6 HOURS PRN
Status: DISCONTINUED | OUTPATIENT
Start: 2018-03-18 | End: 2018-03-19

## 2018-03-18 RX ORDER — KETOROLAC TROMETHAMINE 30 MG/ML
30 INJECTION, SOLUTION INTRAMUSCULAR; INTRAVENOUS ONCE
Status: DISCONTINUED | OUTPATIENT
Start: 2018-03-18 | End: 2018-03-18

## 2018-03-18 RX ORDER — SODIUM CHLORIDE 9 MG/ML
INJECTION, SOLUTION INTRAVENOUS CONTINUOUS
Status: DISCONTINUED | OUTPATIENT
Start: 2018-03-18 | End: 2018-03-18

## 2018-03-18 RX ORDER — DIPHENHYDRAMINE HYDROCHLORIDE 50 MG/ML
25 INJECTION INTRAMUSCULAR; INTRAVENOUS ONCE
Status: COMPLETED | OUTPATIENT
Start: 2018-03-18 | End: 2018-03-18

## 2018-03-18 RX ORDER — ACETAMINOPHEN 325 MG/1
650 TABLET ORAL EVERY 6 HOURS PRN
Status: DISCONTINUED | OUTPATIENT
Start: 2018-03-18 | End: 2018-03-19

## 2018-03-18 NOTE — PLAN OF CARE
Problem: PAIN - ADULT  Goal: Verbalizes/displays adequate comfort level or patient's stated pain goal  INTERVENTIONS:  - Encourage pt to monitor pain and request assistance  - Assess pain using appropriate pain scale  - Administer analgesics based on type ordered  - Instruct patient on fluid and nutrition restrictions as appropriate  Outcome: Progressing  Electrolyte protocol

## 2018-03-18 NOTE — H&P
[facility]  History and Physical     Alexander Simms Patient Status:  Emergency    1990 MRN [id number]   Location [address] [facility] Attending Charla Prader, MD   Hosp Day # 0 [facility]     Chief Complaint: n/ Clindamycin Phosphate 2 % Vaginal Cream Place 1 applicator vaginally nightly. Disp: 40 g Rfl: 0   Desogestrel-Ethinyl Estradiol (MIRCETTE) 0.15-0.02/0.01 MG (21/5) Oral Tab Take 1 tablet by mouth daily.  Disp: 28 tablet Rfl: 2   HYDROcodone-acetaminophen ALT  14  13*   BILT  0.8  1.2   TP  7.5  7.5       Estimated Creatinine Clearance: 81.9 mL/min (based on SCr of 0.79 mg/dL). No results for input(s): PTP, INR in the last 72 hours. No results for input(s): TROP, CK in the last 72 hours.     Imaging:

## 2018-03-18 NOTE — PROGRESS NOTES
Dr Manuel Damon paged to confirm he was aware of consult. md way paged to notify of elevated diastolic b/p.

## 2018-03-18 NOTE — ED PROVIDER NOTES
Patient Seen in: [facility] Emergency Department    History   Patient presents with:  Abdomen/Flank Pain (GI/)  Nausea/Vomiting/Diarrhea (gastrointestinal)    Stated Complaint: n/v/d x 3 days, abd pain    HPI    75-year-old female complaining of a 18  Temp: 100.6 °F (38.1 °C)  Temp src: Temporal  SpO2: 100 %  O2 Device: None (Room air)    Current:BP (!) 163/103   Pulse 63   Temp 100.6 °F (38.1 °C) (Temporal)   Resp 18   Ht 162.6 cm (5' 4\")   Wt 49.4 kg   LMP 03/16/2018   SpO2 100%   BMI 18.71 kg/m² BLUE   RAINBOW DRAW LAVENDER   RAINBOW DRAW LIGHT GREEN   RAINBOW DRAW GOLD       ED Course as of Mar 18 1540  ------------------------------------------------------------       MDM     IV was started labs drawn potassium 3.4 CBC was unremarkable lipase 72

## 2018-03-18 NOTE — ED INITIAL ASSESSMENT (HPI)
Lower abdominal cramping and vomiting since this past Friday. Reports that is also when her menstrual period cycle. Reports she was in this ED that same day and was diagnosed with dysmenorrhea.

## 2018-03-19 VITALS
RESPIRATION RATE: 18 BRPM | BODY MASS INDEX: 18.61 KG/M2 | SYSTOLIC BLOOD PRESSURE: 137 MMHG | OXYGEN SATURATION: 100 % | TEMPERATURE: 99 F | HEIGHT: 64 IN | WEIGHT: 109 LBS | HEART RATE: 63 BPM | DIASTOLIC BLOOD PRESSURE: 90 MMHG

## 2018-03-19 LAB — POTASSIUM SERPL-SCNC: 3.9 MMOL/L (ref 3.6–5.1)

## 2018-03-19 PROCEDURE — 99252 IP/OBS CONSLTJ NEW/EST SF 35: CPT | Performed by: OBSTETRICS & GYNECOLOGY

## 2018-03-19 PROCEDURE — 99239 HOSP IP/OBS DSCHRG MGMT >30: CPT | Performed by: INTERNAL MEDICINE

## 2018-03-19 RX ORDER — DIPHENHYDRAMINE HYDROCHLORIDE 50 MG/ML
25 INJECTION INTRAMUSCULAR; INTRAVENOUS ONCE
Status: COMPLETED | OUTPATIENT
Start: 2018-03-19 | End: 2018-03-19

## 2018-03-19 RX ORDER — DESOGESTREL AND ETHINYL ESTRADIOL 21-5 (28)
1 KIT ORAL DAILY
Status: DISCONTINUED | OUTPATIENT
Start: 2018-03-19 | End: 2018-03-19

## 2018-03-19 RX ORDER — LORAZEPAM 0.5 MG/1
0.5 TABLET ORAL ONCE
Status: COMPLETED | OUTPATIENT
Start: 2018-03-19 | End: 2018-03-19

## 2018-03-19 RX ORDER — IBUPROFEN 400 MG/1
400 TABLET ORAL EVERY 6 HOURS PRN
Status: DISCONTINUED | OUTPATIENT
Start: 2018-03-19 | End: 2018-03-19

## 2018-03-19 RX ORDER — HYDROCODONE BITARTRATE AND ACETAMINOPHEN 5; 325 MG/1; MG/1
1-2 TABLET ORAL EVERY 4 HOURS PRN
Qty: 20 TABLET | Refills: 0 | Status: SHIPPED | OUTPATIENT
Start: 2018-03-19

## 2018-03-19 NOTE — CONSULTS
[facility]  Report of Consultation    Cony Deirdre Patient Status:  Inpatient    1990 MRN [id number]   [facility] [address]A Attending Eldon Dasilva MD;Ragot*   Hosp Day # 1 PCP [facility]     Reason for Consultation:  dysmen STD's or PID. No gyn procedures.   Last PAP normal      OB History:      Allergies:  No Known Allergies    Medications:    Current Facility-Administered Medications:   •  Ketorolac Tromethamine (TORADOL) injection 30 mg, 30 mg, Intravenous, Q6H PRN normal limits. Spleen is not palpable. Pelvic: deferred  Rectal:  deferred    Extremities:  No lower extremity edema noted. Without clubbing or cyanosis. Skin: Normal texture and turgor. Lymphatic:  No palpable cervical lymphadenopathy.   Neurologic:

## 2018-03-19 NOTE — PLAN OF CARE
Patient/Family Goals    • Patient/Family Long Term Goal Not Progressing          GASTROINTESTINAL - ADULT    • Minimal or absence of nausea and vomiting Progressing    • Maintains adequate nutritional intake (undernourished) Progressing    • Achieves appro

## 2018-03-19 NOTE — PAYOR COMM NOTE
--------------  ADMISSION REVIEW     Payor: Seth Coronel #:  [id number]  Authorization Number: N/A    Admit date: 3/18/18  Admit time: 1706       Admitting Physician: Umer Rayo MD  Attending Physician:  Geraldo Perales Ruptured ovarian cyst 2015    no surgery, medical management      History reviewed. No pertinent surgical history. Review of Systems  Positive for stated complaint: n/v/d x 3 days, abd pain  Other systems are as noted in HPI.   Constitutional and vital s URINE     ED Course as of Mar 18 1540  MDM[RH.1]   IV was started labs drawn potassium 3.4 CBC was unremarkable lipase 72.   This patient here now for her fourth visit since March 12 for recurrent abdominal pain consistent with severe dysmenorrhea however s facility-administered medications on file prior to encounter. Current Outpatient Prescriptions on File Prior to Encounter:  Dicyclomine HCl 20 MG Oral Tab Take 1 tablet (20 mg total) by mouth 4 (four) times daily as needed.  Disp: 30 tablet Rfl: 0   ondan intact. Musculoskeletal: Moves all extremities. Extremities: No edema or cyanosis. Integument: No rashes or lesions. Psychiatric: Appropriate mood and affect.     Diagnostic Data:      Labs:  Recent Labs   Lab  03/16/18   1649  03/18/18   1417   WBC  1 3/19/2018 0735 Given 30 mg Intravenous Salem Cheyenne, RN    6/94/6634 2045 Given 30 mg Intravenous Bj Cheyenne, RN      ketorolac tromethamine (TORADOL) 30 MG/ML injection 15 mg     Date Action Dose Route User    3/18/2018 1449 Given 15 mg Intraveno

## 2018-03-19 NOTE — PROGRESS NOTES
[facility]  Progress Note     Marta Mckinley Patient Status:  Inpatient    1990 MRN [id number]   [facility] [address]A Attending Wali Branch MD;Ragot*   Hosp Day # 1 PCP [facility]     Chief Complaint: N/V abd pain    S: P mg Intravenous Q8H       ASSESSMENT / PLAN:     1. Intractable pelvic pain and nausea and vomiting  1. gyn consulted  2. Continue pain control/IVF  3. Suspect due to dysmenorrhea and/or hemorrhagic ovarian cyst  2. Hypokalemia-replace per protocol  3.  Bact

## 2018-03-19 NOTE — PLAN OF CARE
PAIN - ADULT    • Verbalizes/displays adequate comfort level or patient's stated pain goal Progressing          METABOLIC/FLUID AND ELECTROLYTES - ADULT    • Electrolytes maintained within normal limits Progressing          GASTROINTESTINAL - ADULT    • Mi

## 2018-03-19 NOTE — DISCHARGE SUMMARY
[facility]  DISCHARGE SUMMARY     Courtney Werner Patient Status:  Inpatient    1990 MRN [id number]   [facility] 3NW-A Attending Nakia Jane MD;Ragot*   Hosp Day # 1 PCP [facility]     Date of Admission: 3/18/2018  Christopher Medication List:     Discharge Medications      CONTINUE taking these medications      Instructions Prescription details   Clindamycin Phosphate 2 % Crea  Commonly known as:  CLEOCIN      Place 1 applicator vaginally nightly.    Stop taking on:  3/20/2018 165/96      -----------------------------------------------------------------------------------------------  PATIENT DISCHARGE INSTRUCTIONS: See electronic chart    Portillo Aldana MD 3/19/2018    Time spent:  > 30 minutes

## 2018-03-20 NOTE — PAYOR COMM NOTE
3/19       [facility]  Report of Consultation           Courtney Werner Patient Status:  Inpatient    1990 MRN [id number]   [facility] [address]NW-A Attending Nakia Jane MD;Ragot*   Hosp Day # 1 PCP [facility]      Reason for Co Oral, Q4H PRN **OR** HYDROcodone-acetaminophen (NORCO) 5-325 MG per tab 1 tablet, 1 tablet, Oral, Q4H PRN **OR** HYDROcodone-acetaminophen (NORCO) 5-325 MG per tab 2 tablet, 2 tablet, Oral, Q4H PRN  •  0.9%  NaCl infusion, , Intravenous, Continuous  •  met Abnormal uterine bleeding (AUB)     Hypokalemia     Intractable vomiting with nausea     Intractable vomiting with nausea, unspecified vomiting type      Feeling better  advise continuous ocs has treatment plan set up in office.   nsaids for dysmenorrhea

## 2018-03-20 NOTE — PROGRESS NOTES
NURSING DISCHARGE NOTE    Discharged Home via Ambulatory. Accompanied by Family member  Belongings Taken by patient/family. Flu shot given, pt tolerated well. Flu shot handout given. Discharge instructions given to patient. All questions answered.

## 2018-06-08 ENCOUNTER — HOSPITAL ENCOUNTER (EMERGENCY)
Facility: HOSPITAL | Age: 28
Discharge: HOME | End: 2018-06-08
Attending: EMERGENCY MEDICINE
Payer: MEDICAID

## 2018-06-08 VITALS
HEART RATE: 61 BPM | SYSTOLIC BLOOD PRESSURE: 143 MMHG | HEIGHT: 64 IN | RESPIRATION RATE: 18 BRPM | BODY MASS INDEX: 17.93 KG/M2 | DIASTOLIC BLOOD PRESSURE: 98 MMHG | WEIGHT: 105 LBS | OXYGEN SATURATION: 100 % | TEMPERATURE: 98 F

## 2018-06-08 DIAGNOSIS — N30.00 ACUTE CYSTITIS WITHOUT HEMATURIA: Primary | ICD-10-CM

## 2018-06-08 DIAGNOSIS — N92.0 MENORRHAGIA WITH REGULAR CYCLE: ICD-10-CM

## 2018-06-08 PROCEDURE — 87086 URINE CULTURE/COLONY COUNT: CPT | Performed by: EMERGENCY MEDICINE

## 2018-06-08 PROCEDURE — 96361 HYDRATE IV INFUSION ADD-ON: CPT

## 2018-06-08 PROCEDURE — 80053 COMPREHEN METABOLIC PANEL: CPT

## 2018-06-08 PROCEDURE — 81001 URINALYSIS AUTO W/SCOPE: CPT | Performed by: EMERGENCY MEDICINE

## 2018-06-08 PROCEDURE — 81001 URINALYSIS AUTO W/SCOPE: CPT

## 2018-06-08 PROCEDURE — 83690 ASSAY OF LIPASE: CPT

## 2018-06-08 PROCEDURE — 96374 THER/PROPH/DIAG INJ IV PUSH: CPT

## 2018-06-08 PROCEDURE — 80053 COMPREHEN METABOLIC PANEL: CPT | Performed by: EMERGENCY MEDICINE

## 2018-06-08 PROCEDURE — 85025 COMPLETE CBC W/AUTO DIFF WBC: CPT

## 2018-06-08 PROCEDURE — 99284 EMERGENCY DEPT VISIT MOD MDM: CPT

## 2018-06-08 PROCEDURE — 81025 URINE PREGNANCY TEST: CPT

## 2018-06-08 PROCEDURE — 96375 TX/PRO/DX INJ NEW DRUG ADDON: CPT

## 2018-06-08 PROCEDURE — 85025 COMPLETE CBC W/AUTO DIFF WBC: CPT | Performed by: EMERGENCY MEDICINE

## 2018-06-08 PROCEDURE — 83690 ASSAY OF LIPASE: CPT | Performed by: EMERGENCY MEDICINE

## 2018-06-08 RX ORDER — LORAZEPAM 2 MG/ML
0.5 INJECTION INTRAMUSCULAR ONCE
Status: COMPLETED | OUTPATIENT
Start: 2018-06-08 | End: 2018-06-08

## 2018-06-08 RX ORDER — SULFAMETHOXAZOLE AND TRIMETHOPRIM 800; 160 MG/1; MG/1
1 TABLET ORAL 2 TIMES DAILY
Qty: 6 TABLET | Refills: 0 | Status: SHIPPED | OUTPATIENT
Start: 2018-06-08 | End: 2018-06-11

## 2018-06-08 RX ORDER — KETOROLAC TROMETHAMINE 30 MG/ML
30 INJECTION, SOLUTION INTRAMUSCULAR; INTRAVENOUS ONCE
Status: COMPLETED | OUTPATIENT
Start: 2018-06-08 | End: 2018-06-08

## 2018-06-08 RX ORDER — ONDANSETRON 2 MG/ML
4 INJECTION INTRAMUSCULAR; INTRAVENOUS ONCE
Status: COMPLETED | OUTPATIENT
Start: 2018-06-08 | End: 2018-06-08

## 2018-06-09 NOTE — ED PROVIDER NOTES
Patient Seen in: [facility] Emergency Department    History   Patient presents with:  Abdomen/Flank Pain (GI/)  Nausea/Vomiting/Diarrhea (gastrointestinal)    Stated Complaint: ABD PAIN    HPI    CHIEF COMPLAINT: Abdominal cramping, pain, vomiting Packs/day: 0.00      Years: 7.00      Smokeless tobacco: Never Used                      Comment: smokes ciggars  Alcohol use:  Yes              Comment: occasional       Review of Systems    Positive for stated agitation       ED Course     Labs Reviewed   COMP METABOLIC PANEL (14) - Abnormal; Notable for the following:        Result Value    Glucose 131 (*)     BUN 7 (*)     AST 13 (*)     All other components within normal limits   LIPASE - Abnormal; Notable fo infection close follow-up with her OB/GYN and primary care physician. ED Course as of Jun 08 2128  ------------------------------------------------------------      MDM     I discussed the  laboratory results with the patient.  I discussed the diagnosis

## 2018-06-09 NOTE — ED PROVIDER NOTES
I reviewed that chart and discussed the case with the physician assistant. I have examined the patient and noted patient will be given antibiotics for urinary tract infection. Also can take anti-inflammatories for irregular menstrual cycle.   Aloe up with

## 2018-06-09 NOTE — ED INITIAL ASSESSMENT (HPI)
Pt here with c/o generalized abdominal pain starting today,  Pt reports this is her typical pain she gets with her period.

## 2018-06-10 ENCOUNTER — APPOINTMENT (OUTPATIENT)
Dept: CT IMAGING | Facility: HOSPITAL | Age: 28
End: 2018-06-10
Attending: EMERGENCY MEDICINE
Payer: MEDICAID

## 2018-06-10 ENCOUNTER — HOSPITAL ENCOUNTER (EMERGENCY)
Facility: HOSPITAL | Age: 28
Discharge: HOME | End: 2018-06-10
Attending: EMERGENCY MEDICINE
Payer: MEDICAID

## 2018-06-10 VITALS
DIASTOLIC BLOOD PRESSURE: 68 MMHG | HEART RATE: 68 BPM | BODY MASS INDEX: 17.93 KG/M2 | TEMPERATURE: 98 F | WEIGHT: 105 LBS | SYSTOLIC BLOOD PRESSURE: 150 MMHG | RESPIRATION RATE: 15 BRPM | OXYGEN SATURATION: 100 % | HEIGHT: 64 IN

## 2018-06-10 DIAGNOSIS — N94.6 DYSMENORRHEA: Primary | ICD-10-CM

## 2018-06-10 DIAGNOSIS — R10.9 ABDOMINAL PAIN OF UNKNOWN ETIOLOGY: ICD-10-CM

## 2018-06-10 PROCEDURE — 81025 URINE PREGNANCY TEST: CPT

## 2018-06-10 PROCEDURE — 80053 COMPREHEN METABOLIC PANEL: CPT | Performed by: EMERGENCY MEDICINE

## 2018-06-10 PROCEDURE — 85025 COMPLETE CBC W/AUTO DIFF WBC: CPT | Performed by: EMERGENCY MEDICINE

## 2018-06-10 PROCEDURE — 99284 EMERGENCY DEPT VISIT MOD MDM: CPT

## 2018-06-10 PROCEDURE — 96374 THER/PROPH/DIAG INJ IV PUSH: CPT

## 2018-06-10 PROCEDURE — 81001 URINALYSIS AUTO W/SCOPE: CPT | Performed by: EMERGENCY MEDICINE

## 2018-06-10 PROCEDURE — 96361 HYDRATE IV INFUSION ADD-ON: CPT

## 2018-06-10 PROCEDURE — 80053 COMPREHEN METABOLIC PANEL: CPT

## 2018-06-10 PROCEDURE — 74177 CT ABD & PELVIS W/CONTRAST: CPT | Performed by: EMERGENCY MEDICINE

## 2018-06-10 PROCEDURE — 85025 COMPLETE CBC W/AUTO DIFF WBC: CPT

## 2018-06-10 PROCEDURE — 96375 TX/PRO/DX INJ NEW DRUG ADDON: CPT

## 2018-06-10 PROCEDURE — 83690 ASSAY OF LIPASE: CPT | Performed by: EMERGENCY MEDICINE

## 2018-06-10 PROCEDURE — 99285 EMERGENCY DEPT VISIT HI MDM: CPT

## 2018-06-10 PROCEDURE — 83690 ASSAY OF LIPASE: CPT

## 2018-06-10 RX ORDER — POTASSIUM CHLORIDE 20 MEQ/1
40 TABLET, EXTENDED RELEASE ORAL ONCE
Status: COMPLETED | OUTPATIENT
Start: 2018-06-10 | End: 2018-06-10

## 2018-06-10 RX ORDER — SODIUM CHLORIDE 9 MG/ML
INJECTION, SOLUTION INTRAVENOUS CONTINUOUS
Status: DISCONTINUED | OUTPATIENT
Start: 2018-06-10 | End: 2018-06-10

## 2018-06-10 RX ORDER — METOCLOPRAMIDE HYDROCHLORIDE 5 MG/ML
10 INJECTION INTRAMUSCULAR; INTRAVENOUS ONCE
Status: COMPLETED | OUTPATIENT
Start: 2018-06-10 | End: 2018-06-10

## 2018-06-10 RX ORDER — ONDANSETRON 2 MG/ML
4 INJECTION INTRAMUSCULAR; INTRAVENOUS ONCE
Status: COMPLETED | OUTPATIENT
Start: 2018-06-10 | End: 2018-06-10

## 2018-06-10 RX ORDER — KETOROLAC TROMETHAMINE 30 MG/ML
30 INJECTION, SOLUTION INTRAMUSCULAR; INTRAVENOUS ONCE
Status: COMPLETED | OUTPATIENT
Start: 2018-06-10 | End: 2018-06-10

## 2018-06-10 RX ORDER — ONDANSETRON 4 MG/1
4 TABLET, ORALLY DISINTEGRATING ORAL EVERY 4 HOURS PRN
Qty: 20 TABLET | Refills: 0 | Status: SHIPPED | OUTPATIENT
Start: 2018-06-10

## 2018-06-10 RX ORDER — DIPHENHYDRAMINE HYDROCHLORIDE 50 MG/ML
25 INJECTION INTRAMUSCULAR; INTRAVENOUS ONCE
Status: COMPLETED | OUTPATIENT
Start: 2018-06-10 | End: 2018-06-10

## 2018-06-10 NOTE — ED PROVIDER NOTES
Patient Seen in: [facility] Emergency Department    History   Patient presents with:  Abdomen/Flank Pain (GI/)  Nausea/Vomiting/Diarrhea (gastrointestinal)    Stated Complaint: vomiting, abd pain    HPI  This is a 33-year-old female who has a long (!) 148/101  Pulse: 63  Resp: 16  Temp: 97.8 °F (36.6 °C)  Temp src: Temporal  SpO2: 100 %  O2 Device: None (Room air)    Current:/88   Pulse 63   Temp 97.8 °F (36.6 °C) (Temporal)   Resp 16   Ht 162.6 cm (5' 4\")   Wt 47.6 kg   LMP 05/14/2018   SpO2 CBC W/ DIFFERENTIAL[[id number]]                              Final result                 Please view results for these tests on the individual orders.    POCT PREGNANCY, URINE   RAINBOW DRAW BLUE   RAINBOW DRAW LAVENDER   RAINBOW DRAW LIGHT GREEN   Griselda Jack stranding ADRENALS:  Normal.  No mass or enlargement. KIDNEYS:  Kidneys are symmetrical in size without evidence of hydronephrosis. BOWEL/MESENTERY:  Bowel is normal in caliber. No evidence of obstruction. The appendix is normal in appearance.  PELVIS:  B Prescribed:  Current Discharge Medication List    START taking these medications    !! ondansetron 4 MG Oral Tablet Dispersible  Take 1 tablet (4 mg total) by mouth every 4 (four) hours as needed for Nausea.   Qty: 20 tablet Refills: 0    !! - Potential dup

## 2018-07-05 ENCOUNTER — APPOINTMENT (OUTPATIENT)
Dept: CT IMAGING | Facility: HOSPITAL | Age: 28
End: 2018-07-05
Attending: EMERGENCY MEDICINE
Payer: MEDICAID

## 2018-07-05 ENCOUNTER — HOSPITAL ENCOUNTER (EMERGENCY)
Facility: HOSPITAL | Age: 28
Discharge: HOME | End: 2018-07-05
Attending: EMERGENCY MEDICINE
Payer: MEDICAID

## 2018-07-05 VITALS
HEART RATE: 63 BPM | RESPIRATION RATE: 16 BRPM | DIASTOLIC BLOOD PRESSURE: 84 MMHG | HEIGHT: 64 IN | BODY MASS INDEX: 17.93 KG/M2 | OXYGEN SATURATION: 100 % | SYSTOLIC BLOOD PRESSURE: 143 MMHG | TEMPERATURE: 99 F | WEIGHT: 105 LBS

## 2018-07-05 DIAGNOSIS — N80.9 ENDOMETRIOSIS: Primary | ICD-10-CM

## 2018-07-05 DIAGNOSIS — R10.84 ABDOMINAL PAIN, GENERALIZED: ICD-10-CM

## 2018-07-05 LAB
ALBUMIN SERPL-MCNC: 4.2 G/DL (ref 3.5–4.8)
ALP LIVER SERPL-CCNC: 51 U/L (ref 37–98)
ALT SERPL-CCNC: 18 U/L (ref 14–54)
AST SERPL-CCNC: 17 U/L (ref 15–41)
BASOPHILS # BLD AUTO: 0.03 X10(3) UL (ref 0–0.1)
BASOPHILS NFR BLD AUTO: 0.3 %
BILIRUB SERPL-MCNC: 0.7 MG/DL (ref 0.1–2)
BILIRUB UR QL STRIP.AUTO: NEGATIVE
BUN BLD-MCNC: 9 MG/DL (ref 8–20)
CALCIUM BLD-MCNC: 8.9 MG/DL (ref 8.3–10.3)
CHLORIDE: 107 MMOL/L (ref 101–111)
CLARITY UR REFRACT.AUTO: CLEAR
CO2: 22 MMOL/L (ref 22–32)
COLOR UR AUTO: YELLOW
CREAT BLD-MCNC: 0.92 MG/DL (ref 0.55–1.02)
EOSINOPHIL # BLD AUTO: 0.01 X10(3) UL (ref 0–0.3)
EOSINOPHIL NFR BLD AUTO: 0.1 %
ERYTHROCYTE [DISTWIDTH] IN BLOOD BY AUTOMATED COUNT: 12.8 % (ref 11.5–16)
GLUCOSE BLD-MCNC: 127 MG/DL (ref 70–99)
GLUCOSE UR STRIP.AUTO-MCNC: NEGATIVE MG/DL
HCT VFR BLD AUTO: 39.8 % (ref 34–50)
HGB BLD-MCNC: 13.3 G/DL (ref 12–16)
IMMATURE GRANULOCYTE COUNT: 0.03 X10(3) UL (ref 0–1)
IMMATURE GRANULOCYTE RATIO %: 0.3 %
KETONES UR STRIP.AUTO-MCNC: NEGATIVE MG/DL
LEUKOCYTE ESTERASE UR QL STRIP.AUTO: NEGATIVE
LIPASE: 86 U/L (ref 73–393)
LYMPHOCYTES # BLD AUTO: 2.2 X10(3) UL (ref 0.9–4)
LYMPHOCYTES NFR BLD AUTO: 25 %
M PROTEIN MFR SERPL ELPH: 7.7 G/DL (ref 6.1–8.3)
MCH RBC QN AUTO: 30 PG (ref 27–33.2)
MCHC RBC AUTO-ENTMCNC: 33.4 G/DL (ref 31–37)
MCV RBC AUTO: 89.6 FL (ref 81–100)
MONOCYTES # BLD AUTO: 0.73 X10(3) UL (ref 0.1–1)
MONOCYTES NFR BLD AUTO: 8.3 %
NEUTROPHIL ABS PRELIM: 5.79 X10 (3) UL (ref 1.3–6.7)
NEUTROPHILS # BLD AUTO: 5.79 X10(3) UL (ref 1.3–6.7)
NEUTROPHILS NFR BLD AUTO: 66 %
NITRITE UR QL STRIP.AUTO: NEGATIVE
PH UR STRIP.AUTO: 6 [PH] (ref 4.5–8)
PLATELET # BLD AUTO: 246 10(3)UL (ref 150–450)
POCT URINE PREGNANCY: NEGATIVE
POTASSIUM SERPL-SCNC: 3.5 MMOL/L (ref 3.6–5.1)
PROT UR STRIP.AUTO-MCNC: 100 MG/DL
RBC # BLD AUTO: 4.44 X10(6)UL (ref 3.8–5.1)
RBC UR QL AUTO: NEGATIVE
RED CELL DISTRIBUTION WIDTH-SD: 42.4 FL (ref 35.1–46.3)
SODIUM SERPL-SCNC: 139 MMOL/L (ref 136–144)
SP GR UR STRIP.AUTO: 1.02 (ref 1–1.03)
UROBILINOGEN UR STRIP.AUTO-MCNC: <2 MG/DL
WBC # BLD AUTO: 8.8 X10(3) UL (ref 4–13)

## 2018-07-05 PROCEDURE — 99285 EMERGENCY DEPT VISIT HI MDM: CPT

## 2018-07-05 PROCEDURE — 85025 COMPLETE CBC W/AUTO DIFF WBC: CPT | Performed by: EMERGENCY MEDICINE

## 2018-07-05 PROCEDURE — 96361 HYDRATE IV INFUSION ADD-ON: CPT

## 2018-07-05 PROCEDURE — 99284 EMERGENCY DEPT VISIT MOD MDM: CPT

## 2018-07-05 PROCEDURE — 96376 TX/PRO/DX INJ SAME DRUG ADON: CPT

## 2018-07-05 PROCEDURE — 96375 TX/PRO/DX INJ NEW DRUG ADDON: CPT

## 2018-07-05 PROCEDURE — 74176 CT ABD & PELVIS W/O CONTRAST: CPT | Performed by: EMERGENCY MEDICINE

## 2018-07-05 PROCEDURE — 81001 URINALYSIS AUTO W/SCOPE: CPT | Performed by: EMERGENCY MEDICINE

## 2018-07-05 PROCEDURE — 83690 ASSAY OF LIPASE: CPT | Performed by: EMERGENCY MEDICINE

## 2018-07-05 PROCEDURE — 96374 THER/PROPH/DIAG INJ IV PUSH: CPT

## 2018-07-05 PROCEDURE — 81025 URINE PREGNANCY TEST: CPT

## 2018-07-05 PROCEDURE — 80053 COMPREHEN METABOLIC PANEL: CPT | Performed by: EMERGENCY MEDICINE

## 2018-07-05 RX ORDER — HYDROMORPHONE HYDROCHLORIDE 1 MG/ML
INJECTION, SOLUTION INTRAMUSCULAR; INTRAVENOUS; SUBCUTANEOUS
Status: COMPLETED
Start: 2018-07-05 | End: 2018-07-05

## 2018-07-05 RX ORDER — HYDROCODONE BITARTRATE AND ACETAMINOPHEN 5; 325 MG/1; MG/1
2 TABLET ORAL ONCE
Status: COMPLETED | OUTPATIENT
Start: 2018-07-05 | End: 2018-07-05

## 2018-07-05 RX ORDER — MORPHINE SULFATE 4 MG/ML
INJECTION, SOLUTION INTRAMUSCULAR; INTRAVENOUS
Status: COMPLETED
Start: 2018-07-05 | End: 2018-07-05

## 2018-07-05 RX ORDER — MORPHINE SULFATE 4 MG/ML
4 INJECTION, SOLUTION INTRAMUSCULAR; INTRAVENOUS EVERY 30 MIN PRN
Status: DISCONTINUED | OUTPATIENT
Start: 2018-07-05 | End: 2018-07-06

## 2018-07-05 RX ORDER — ONDANSETRON 2 MG/ML
4 INJECTION INTRAMUSCULAR; INTRAVENOUS ONCE
Status: COMPLETED | OUTPATIENT
Start: 2018-07-05 | End: 2018-07-05

## 2018-07-05 RX ORDER — ONDANSETRON 8 MG/1
8 TABLET, ORALLY DISINTEGRATING ORAL EVERY 4 HOURS PRN
Qty: 10 TABLET | Refills: 0 | Status: SHIPPED | OUTPATIENT
Start: 2018-07-05 | End: 2018-07-12

## 2018-07-05 RX ORDER — HYDROCODONE BITARTRATE AND ACETAMINOPHEN 5; 325 MG/1; MG/1
1-2 TABLET ORAL EVERY 4 HOURS PRN
Qty: 10 TABLET | Refills: 0 | Status: SHIPPED | OUTPATIENT
Start: 2018-07-05 | End: 2018-07-12

## 2018-07-05 RX ORDER — ONDANSETRON 2 MG/ML
INJECTION INTRAMUSCULAR; INTRAVENOUS
Status: DISCONTINUED
Start: 2018-07-05 | End: 2018-07-06

## 2018-07-05 RX ORDER — METOCLOPRAMIDE HYDROCHLORIDE 5 MG/ML
10 INJECTION INTRAMUSCULAR; INTRAVENOUS ONCE
Status: COMPLETED | OUTPATIENT
Start: 2018-07-05 | End: 2018-07-05

## 2018-07-05 RX ORDER — IBUPROFEN 600 MG/1
600 TABLET ORAL EVERY 8 HOURS PRN
Qty: 30 TABLET | Refills: 0 | Status: SHIPPED | OUTPATIENT
Start: 2018-07-05 | End: 2018-07-12

## 2018-07-05 RX ORDER — METOCLOPRAMIDE 10 MG/1
10 TABLET ORAL 3 TIMES DAILY PRN
Qty: 20 TABLET | Refills: 0 | Status: SHIPPED | OUTPATIENT
Start: 2018-07-05 | End: 2018-08-04

## 2018-07-05 RX ORDER — DIPHENHYDRAMINE HYDROCHLORIDE 50 MG/ML
25 INJECTION INTRAMUSCULAR; INTRAVENOUS ONCE
Status: COMPLETED | OUTPATIENT
Start: 2018-07-05 | End: 2018-07-05

## 2018-07-05 RX ORDER — HYDROMORPHONE HYDROCHLORIDE 1 MG/ML
0.5 INJECTION, SOLUTION INTRAMUSCULAR; INTRAVENOUS; SUBCUTANEOUS ONCE
Status: COMPLETED | OUTPATIENT
Start: 2018-07-05 | End: 2018-07-05

## 2018-07-05 RX ORDER — KETOROLAC TROMETHAMINE 30 MG/ML
30 INJECTION, SOLUTION INTRAMUSCULAR; INTRAVENOUS ONCE
Status: COMPLETED | OUTPATIENT
Start: 2018-07-05 | End: 2018-07-05

## 2018-07-05 NOTE — ED PROVIDER NOTES
Patient Seen in: [facility] Emergency Department    History   Patient presents with:  Abdomen/Flank Pain (GI/)    Stated Complaint: abdominal pain    HPI    20-year-old woman who comes emergency department with abdominal pain in the mid abdomen th peritoneal signs   Extremities: no edema, normal peripheral pulses   Neuro: Alert oriented and nonfocal       ED Course     Labs Reviewed   COMP METABOLIC PANEL (14) - Abnormal; Notable for the following:        Result Value    Glucose 127 (*)     [place] pm    Follow-up:  Rosa Chi  [address] [facility] [address]  [phone]    Schedule an appointment as soon as possible for a visit in 2 days      Charlie [address] [place] [address].  [place] [phone]    In 1 we

## 2018-07-05 NOTE — ED NOTES
Pt states that she can find a ride home if we give her medication that does not permit her to drive.

## 2018-07-05 NOTE — ED INITIAL ASSESSMENT (HPI)
Pt with periumbilical abd pain since Monday; Sigmund Kenrick since yesterday, vom x 4 today; no fever/diarrhea; last BM was this morning - soft per pt

## 2018-07-05 NOTE — ED NOTES
Dr Analisa Bee aware that pregnancy test was not completed on patient. Pt denies any chance of pregnancy.  Dr Analisa Bee would like pt to receive toradol without a negative preg test.

## 2018-07-06 NOTE — ED NOTES
Family at the bedside. Patient is yelling out, moaning with c/o increased abdominal pain and nausea.  Sister is demanding further treatment and stating she needs to speak with MD

## 2018-07-06 NOTE — ED NOTES
Warm packed given for comfort. Patient continues to yell out and moan. Sister at the bedside sobbing. Emotional support provided.

## 2018-10-28 ENCOUNTER — HOSPITAL ENCOUNTER (EMERGENCY)
Facility: HOSPITAL | Age: 28
Discharge: HOME | End: 2018-10-28
Attending: EMERGENCY MEDICINE
Payer: MEDICAID

## 2018-10-28 VITALS
OXYGEN SATURATION: 100 % | TEMPERATURE: 99 F | WEIGHT: 110 LBS | DIASTOLIC BLOOD PRESSURE: 91 MMHG | BODY MASS INDEX: 18.78 KG/M2 | HEIGHT: 64 IN | RESPIRATION RATE: 18 BRPM | SYSTOLIC BLOOD PRESSURE: 129 MMHG | HEART RATE: 65 BPM

## 2018-10-28 DIAGNOSIS — R11.2 NON-INTRACTABLE VOMITING WITH NAUSEA, UNSPECIFIED VOMITING TYPE: Primary | ICD-10-CM

## 2018-10-28 DIAGNOSIS — R10.9 ABDOMINAL PAIN, ACUTE: ICD-10-CM

## 2018-10-28 PROCEDURE — 99285 EMERGENCY DEPT VISIT HI MDM: CPT

## 2018-10-28 PROCEDURE — 81001 URINALYSIS AUTO W/SCOPE: CPT | Performed by: EMERGENCY MEDICINE

## 2018-10-28 PROCEDURE — 96374 THER/PROPH/DIAG INJ IV PUSH: CPT

## 2018-10-28 PROCEDURE — 99284 EMERGENCY DEPT VISIT MOD MDM: CPT

## 2018-10-28 PROCEDURE — 96361 HYDRATE IV INFUSION ADD-ON: CPT

## 2018-10-28 PROCEDURE — 85025 COMPLETE CBC W/AUTO DIFF WBC: CPT | Performed by: EMERGENCY MEDICINE

## 2018-10-28 PROCEDURE — 83690 ASSAY OF LIPASE: CPT | Performed by: EMERGENCY MEDICINE

## 2018-10-28 PROCEDURE — 96375 TX/PRO/DX INJ NEW DRUG ADDON: CPT

## 2018-10-28 PROCEDURE — 81025 URINE PREGNANCY TEST: CPT

## 2018-10-28 PROCEDURE — 80053 COMPREHEN METABOLIC PANEL: CPT | Performed by: EMERGENCY MEDICINE

## 2018-10-28 RX ORDER — METOCLOPRAMIDE HYDROCHLORIDE 5 MG/ML
10 INJECTION INTRAMUSCULAR; INTRAVENOUS ONCE
Status: COMPLETED | OUTPATIENT
Start: 2018-10-28 | End: 2018-10-28

## 2018-10-28 RX ORDER — DIPHENHYDRAMINE HYDROCHLORIDE 50 MG/ML
50 INJECTION INTRAMUSCULAR; INTRAVENOUS ONCE
Status: COMPLETED | OUTPATIENT
Start: 2018-10-28 | End: 2018-10-28

## 2018-10-28 RX ORDER — ONDANSETRON 4 MG/1
4 TABLET, ORALLY DISINTEGRATING ORAL EVERY 4 HOURS PRN
Qty: 10 TABLET | Refills: 0 | Status: SHIPPED | OUTPATIENT
Start: 2018-10-28 | End: 2018-11-04

## 2018-10-28 RX ORDER — TRAMADOL HYDROCHLORIDE 50 MG/1
TABLET ORAL EVERY 4 HOURS PRN
Qty: 10 TABLET | Refills: 0 | Status: SHIPPED | OUTPATIENT
Start: 2018-10-28 | End: 2018-11-04

## 2018-10-28 RX ORDER — MORPHINE SULFATE 4 MG/ML
4 INJECTION, SOLUTION INTRAMUSCULAR; INTRAVENOUS ONCE
Status: COMPLETED | OUTPATIENT
Start: 2018-10-28 | End: 2018-10-28

## 2018-10-28 RX ORDER — DIPHENHYDRAMINE HCL 50 MG
50 CAPSULE ORAL EVERY 6 HOURS PRN
COMMUNITY

## 2018-10-28 NOTE — ED NOTES
Pt awake and alert, skin w/d,resps reg/unlabored. Pt states she is feeling much better. Pt up to bathroom with steady gait.

## 2018-10-28 NOTE — ED PROVIDER NOTES
Patient Seen in: [facility] Emergency Department    History   Patient presents with:  Nausea/Vomiting/Diarrhea (gastrointestinal)    Stated Complaint: Nausea and vominting x 2 days    HPI    22-year-old female presents to the emergency department co (!) 152/105   Pulse 80   Resp 22   Temp 98.7 °F (37.1 °C)   Temp src Temporal   SpO2 99 %   O2 Device None (Room air)       Current:BP (!) 129/91   Pulse 65   Temp 98.7 °F (37.1 °C) (Temporal)   Resp 18   Ht 162.6 cm (5' 4\")   Wt 49.9 kg   LMP 10/01/2018 Abnormality         Status                     ---------                               -----------         ------                     CBC W/ DIFFERENTIAL[[id number]]          Abnormal            Final result                 Please view results for thes medications found. Please discuss with provider.

## 2018-10-28 NOTE — ED NOTES
Pt asleep but easily aroused, skin w/d,resps reg/unlabored. Pt states she is feeling much better. Pt denies any pain or nausea at this time. Pt states she does have to urinate, so RN to ambulate to bathroom.

## 2018-10-28 NOTE — ED INITIAL ASSESSMENT (HPI)
Pt presents to the ED accompanied by family with complaints of abdominal pain, nausea and vomiting x 2 days. Pt states she has a history of endometriosis and this occurs every month around the start of her cycle.  Pt states she last saw a gyne in august who

## 2018-10-28 NOTE — ED NOTES
Pt tearful, appears anxious. Pt states she was seen at [facility] yesterday and treated with morphine and benadryl and told she needed a hysterectomy. Parents at bedside.

## 2018-12-21 ENCOUNTER — APPOINTMENT (OUTPATIENT)
Dept: ULTRASOUND IMAGING | Facility: HOSPITAL | Age: 28
End: 2018-12-21
Attending: EMERGENCY MEDICINE
Payer: MEDICAID

## 2018-12-21 ENCOUNTER — HOSPITAL ENCOUNTER (EMERGENCY)
Facility: HOSPITAL | Age: 28
Discharge: HOME | End: 2018-12-21
Attending: EMERGENCY MEDICINE
Payer: MEDICAID

## 2018-12-21 VITALS
RESPIRATION RATE: 18 BRPM | BODY MASS INDEX: 18.78 KG/M2 | SYSTOLIC BLOOD PRESSURE: 163 MMHG | WEIGHT: 110 LBS | DIASTOLIC BLOOD PRESSURE: 88 MMHG | HEART RATE: 88 BPM | OXYGEN SATURATION: 100 % | HEIGHT: 64 IN | TEMPERATURE: 98 F

## 2018-12-21 DIAGNOSIS — R11.2 NAUSEA AND VOMITING IN ADULT: Primary | ICD-10-CM

## 2018-12-21 DIAGNOSIS — R10.9 ABDOMINAL PAIN OF UNKNOWN ETIOLOGY: ICD-10-CM

## 2018-12-21 PROCEDURE — 96361 HYDRATE IV INFUSION ADD-ON: CPT

## 2018-12-21 PROCEDURE — 93975 VASCULAR STUDY: CPT | Performed by: EMERGENCY MEDICINE

## 2018-12-21 PROCEDURE — 81001 URINALYSIS AUTO W/SCOPE: CPT | Performed by: EMERGENCY MEDICINE

## 2018-12-21 PROCEDURE — 99284 EMERGENCY DEPT VISIT MOD MDM: CPT

## 2018-12-21 PROCEDURE — 81025 URINE PREGNANCY TEST: CPT

## 2018-12-21 PROCEDURE — 76856 US EXAM PELVIC COMPLETE: CPT | Performed by: EMERGENCY MEDICINE

## 2018-12-21 PROCEDURE — 96375 TX/PRO/DX INJ NEW DRUG ADDON: CPT

## 2018-12-21 PROCEDURE — 96374 THER/PROPH/DIAG INJ IV PUSH: CPT

## 2018-12-21 PROCEDURE — 76830 TRANSVAGINAL US NON-OB: CPT | Performed by: EMERGENCY MEDICINE

## 2018-12-21 PROCEDURE — 85025 COMPLETE CBC W/AUTO DIFF WBC: CPT | Performed by: EMERGENCY MEDICINE

## 2018-12-21 PROCEDURE — 80053 COMPREHEN METABOLIC PANEL: CPT | Performed by: EMERGENCY MEDICINE

## 2018-12-21 PROCEDURE — 83690 ASSAY OF LIPASE: CPT | Performed by: EMERGENCY MEDICINE

## 2018-12-21 PROCEDURE — 99285 EMERGENCY DEPT VISIT HI MDM: CPT

## 2018-12-21 RX ORDER — KETOROLAC TROMETHAMINE 30 MG/ML
15 INJECTION, SOLUTION INTRAMUSCULAR; INTRAVENOUS ONCE
Status: COMPLETED | OUTPATIENT
Start: 2018-12-21 | End: 2018-12-21

## 2018-12-21 RX ORDER — HALOPERIDOL 5 MG/ML
2 INJECTION INTRAMUSCULAR ONCE
Status: COMPLETED | OUTPATIENT
Start: 2018-12-21 | End: 2018-12-21

## 2018-12-21 RX ORDER — ONDANSETRON 4 MG/1
4 TABLET, ORALLY DISINTEGRATING ORAL EVERY 4 HOURS PRN
Qty: 10 TABLET | Refills: 0 | Status: SHIPPED | OUTPATIENT
Start: 2018-12-21 | End: 2018-12-28

## 2018-12-21 RX ORDER — METOCLOPRAMIDE HYDROCHLORIDE 5 MG/ML
10 INJECTION INTRAMUSCULAR; INTRAVENOUS ONCE
Status: COMPLETED | OUTPATIENT
Start: 2018-12-21 | End: 2018-12-21

## 2018-12-21 RX ORDER — DIPHENHYDRAMINE HYDROCHLORIDE 50 MG/ML
25 INJECTION INTRAMUSCULAR; INTRAVENOUS ONCE
Status: COMPLETED | OUTPATIENT
Start: 2018-12-21 | End: 2018-12-21

## 2018-12-21 RX ORDER — MORPHINE SULFATE 4 MG/ML
4 INJECTION, SOLUTION INTRAMUSCULAR; INTRAVENOUS ONCE
Status: COMPLETED | OUTPATIENT
Start: 2018-12-21 | End: 2018-12-21

## 2018-12-21 RX ORDER — ONDANSETRON 2 MG/ML
4 INJECTION INTRAMUSCULAR; INTRAVENOUS ONCE
Status: COMPLETED | OUTPATIENT
Start: 2018-12-21 | End: 2018-12-21

## 2018-12-21 NOTE — ED NOTES
PT READY FOR DISCHARGE BY ATTENDING HOWEVER WHEN DISCHARGING THE PATIENT THE PATIENT RATED HER PAIN 8/10 AND BEGAN CRYING ATTENDING NOTIFIED, DISCHARGE HELD.  ATTENDING AT BEDSIDE

## 2018-12-21 NOTE — ED NOTES
DR. Ashli Bean RE-EVALUATED THE PATIENT AND PATIENT OKAY TO DISCHARGE HOME. PT PARENTS AT BEDSIDE, PATIENT AND PARENTS VERBALIZED UNDERSTANDING OF DISCHARGE INSTRUCTIONS. IV DC'D AND PT DISCHARGED HOME WITH PARENTS.

## 2018-12-21 NOTE — ED NOTES
PT DRY HEAVING STATES THAT SHE NEEDS TO EAT SOMETHING THE NAUSEA IS MADE WORSE BECAUSE SHE HAS NOT EATEN

## 2018-12-21 NOTE — ED NOTES
Patient escorted to restroom via 1 assist while ambulating. assisted to restroom, patient began crouching over and holding on to my waist in order to walk. Patient able to obtain urine sample alone in restroom but began yelling in pain.   Patient yelling a

## 2018-12-21 NOTE — ED NOTES
DR. Ivanna Li ORDERED HALDOL IV. PT IV DISCONTINUED DURING DISCHARGE ATTEMPT. IV ACCESS OBTAINED AND HALDOL ADMINISTERED.

## 2018-12-21 NOTE — ED PROVIDER NOTES
Patient Seen in: [facility] Emergency Department    History   Patient presents with:  Nausea/Vomiting/Diarrhea (gastrointestinal)    Stated Complaint: NVD    HPI    42-year-old female presents emergency room with chief complaint of nausea vomiting a Resp 20   Temp 97.5 °F (36.4 °C)   Temp src Temporal   SpO2 100 %   O2 Device None (Room air)       Current:BP (!) 163/88   Pulse 88   Temp 97.5 °F (36.4 °C) (Temporal)   Resp 18   Ht 162.6 cm (5' 4\")   Wt 49.9 kg   LMP 11/30/2018   SpO2 100%   BMI 18. 8 DIFFERENTIAL[[id number]]          Abnormal            Final result                 Please view results for these tests on the individual orders.    POCT PREGNANCY, URINE   RAINBOW DRAW BLUE   RAINBOW DRAW LAVENDER   RAINBOW DRAW LIGHT GREEN   RAINBOW DRAW GO or changing/persisting symptoms. I discussed the case with the patient and they had no questions, complaints, or concerns. Patient was comfortable going home.             Disposition and Plan     Clinical Impression:  Nausea and vomiting in adult  (primar

## 2019-06-11 ENCOUNTER — APPOINTMENT (OUTPATIENT)
Dept: CT IMAGING | Facility: HOSPITAL | Age: 29
End: 2019-06-11
Attending: EMERGENCY MEDICINE
Payer: MEDICAID

## 2019-06-11 ENCOUNTER — HOSPITAL ENCOUNTER (EMERGENCY)
Facility: HOSPITAL | Age: 29
Discharge: HOME | End: 2019-06-11
Attending: EMERGENCY MEDICINE
Payer: MEDICAID

## 2019-06-11 VITALS
BODY MASS INDEX: 17.93 KG/M2 | TEMPERATURE: 98 F | HEIGHT: 64 IN | WEIGHT: 105 LBS | OXYGEN SATURATION: 100 % | RESPIRATION RATE: 16 BRPM | SYSTOLIC BLOOD PRESSURE: 133 MMHG | HEART RATE: 50 BPM | DIASTOLIC BLOOD PRESSURE: 94 MMHG

## 2019-06-11 DIAGNOSIS — R11.2 NAUSEA AND VOMITING IN ADULT: ICD-10-CM

## 2019-06-11 DIAGNOSIS — R10.9 ABDOMINAL PAIN OF UNKNOWN ETIOLOGY: Primary | ICD-10-CM

## 2019-06-11 PROCEDURE — 74177 CT ABD & PELVIS W/CONTRAST: CPT | Performed by: EMERGENCY MEDICINE

## 2019-06-11 PROCEDURE — 96375 TX/PRO/DX INJ NEW DRUG ADDON: CPT

## 2019-06-11 PROCEDURE — 81001 URINALYSIS AUTO W/SCOPE: CPT | Performed by: EMERGENCY MEDICINE

## 2019-06-11 PROCEDURE — 96361 HYDRATE IV INFUSION ADD-ON: CPT

## 2019-06-11 PROCEDURE — 83690 ASSAY OF LIPASE: CPT | Performed by: EMERGENCY MEDICINE

## 2019-06-11 PROCEDURE — 96372 THER/PROPH/DIAG INJ SC/IM: CPT

## 2019-06-11 PROCEDURE — 80053 COMPREHEN METABOLIC PANEL: CPT | Performed by: EMERGENCY MEDICINE

## 2019-06-11 PROCEDURE — 85025 COMPLETE CBC W/AUTO DIFF WBC: CPT | Performed by: EMERGENCY MEDICINE

## 2019-06-11 PROCEDURE — 96374 THER/PROPH/DIAG INJ IV PUSH: CPT

## 2019-06-11 PROCEDURE — 81025 URINE PREGNANCY TEST: CPT

## 2019-06-11 PROCEDURE — 99284 EMERGENCY DEPT VISIT MOD MDM: CPT

## 2019-06-11 RX ORDER — DICYCLOMINE HYDROCHLORIDE 10 MG/ML
20 INJECTION INTRAMUSCULAR ONCE
Status: COMPLETED | OUTPATIENT
Start: 2019-06-11 | End: 2019-06-11

## 2019-06-11 RX ORDER — ONDANSETRON 4 MG/1
4 TABLET, ORALLY DISINTEGRATING ORAL EVERY 4 HOURS PRN
Qty: 10 TABLET | Refills: 0 | Status: SHIPPED | OUTPATIENT
Start: 2019-06-11 | End: 2019-06-18

## 2019-06-11 RX ORDER — KETOROLAC TROMETHAMINE 30 MG/ML
15 INJECTION, SOLUTION INTRAMUSCULAR; INTRAVENOUS ONCE
Status: COMPLETED | OUTPATIENT
Start: 2019-06-11 | End: 2019-06-11

## 2019-06-11 RX ORDER — HYDROMORPHONE HYDROCHLORIDE 1 MG/ML
0.5 INJECTION, SOLUTION INTRAMUSCULAR; INTRAVENOUS; SUBCUTANEOUS EVERY 30 MIN PRN
Status: DISCONTINUED | OUTPATIENT
Start: 2019-06-11 | End: 2019-06-12

## 2019-06-11 RX ORDER — ONDANSETRON 2 MG/ML
4 INJECTION INTRAMUSCULAR; INTRAVENOUS ONCE
Status: COMPLETED | OUTPATIENT
Start: 2019-06-11 | End: 2019-06-11

## 2019-06-11 RX ORDER — DICYCLOMINE HCL 20 MG
20 TABLET ORAL 4 TIMES DAILY PRN
Qty: 30 TABLET | Refills: 0 | Status: SHIPPED | OUTPATIENT
Start: 2019-06-11 | End: 2019-07-11

## 2019-06-11 NOTE — ED INITIAL ASSESSMENT (HPI)
Pt states abd pain since Saturday. Pt states nausea and vomiting. Pt states last BM was Sunday. Pt states unable to eat or drink and feels a ball in her stomach.

## 2019-06-12 NOTE — ED PROVIDER NOTES
Patient Seen in: [facility] Emergency Department    History   Patient presents with:  Abdomen/Flank Pain (GI/)  Nausea/Vomiting/Diarrhea (gastrointestinal)    Stated Complaint: abd pain/vomiting    HPI    Patient is a 38-year-old female who states Physical Exam  GENERAL: Patient resting comfortably on the cart in no acute distress. HEENT: Extraocular muscles intact, pupils equal round reactive to light and accommodation. Mouth normal, neck supple, no meningismus.   LUNGS: Lungs clear to [employer] Final result                 Please view results for these tests on the individual orders.    POCT PREGNANCY, URINE          CT abdomen pelvis CONCLUSION:  No clear etiology of the patient's symptoms.  Visualized appendix has a normal appearance.  If c

## 2019-10-02 ENCOUNTER — WALK IN (OUTPATIENT)
Dept: URGENT CARE | Age: 29
End: 2019-10-02

## 2019-10-02 DIAGNOSIS — Z11.1 SCREENING-PULMONARY TB: Primary | ICD-10-CM

## 2019-10-02 PROCEDURE — 86580 TB INTRADERMAL TEST: CPT | Performed by: NURSE PRACTITIONER

## 2019-10-05 ENCOUNTER — WALK IN (OUTPATIENT)
Dept: URGENT CARE | Age: 29
End: 2019-10-05

## 2019-10-05 DIAGNOSIS — Z11.1 ENCOUNTER FOR PPD SKIN TEST READING: Primary | ICD-10-CM

## 2019-10-05 PROCEDURE — X1094 NO CHARGE VISIT: HCPCS | Performed by: NURSE PRACTITIONER

## 2022-01-07 ENCOUNTER — HOSPITAL ENCOUNTER (EMERGENCY)
Age: 32
Discharge: HOME | End: 2022-01-08
Attending: EMERGENCY MEDICINE

## 2022-01-07 ENCOUNTER — APPOINTMENT (OUTPATIENT)
Dept: GENERAL RADIOLOGY | Age: 32
End: 2022-01-07
Attending: EMERGENCY MEDICINE

## 2022-01-07 VITALS
BODY MASS INDEX: 19.01 KG/M2 | SYSTOLIC BLOOD PRESSURE: 158 MMHG | HEIGHT: 64 IN | WEIGHT: 111.33 LBS | RESPIRATION RATE: 11 BRPM | TEMPERATURE: 99.5 F | DIASTOLIC BLOOD PRESSURE: 115 MMHG | OXYGEN SATURATION: 100 % | HEART RATE: 101 BPM

## 2022-01-07 DIAGNOSIS — F10.10 ALCOHOL ABUSE: ICD-10-CM
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
## 2022-01-07 LAB
ANION GAP SERPL CALC-SCNC: 15 MMOL/L (ref 10–20)
BASOPHILS # BLD: 0.1 K/MCL (ref 0–0.3)
BASOPHILS NFR BLD: 1 %
BUN SERPL-MCNC: 15 MG/DL (ref 6–20)
BUN/CREAT SERPL: 16 (ref 7–25)
CALCIUM SERPL-MCNC: 9.3 MG/DL (ref 8.4–10.2)
CHLORIDE SERPL-SCNC: 106 MMOL/L (ref 98–107)
CO2 SERPL-SCNC: 22 MMOL/L (ref 21–32)
CREAT SERPL-MCNC: 0.93 MG/DL (ref 0.51–0.95)
DEPRECATED RDW RBC: 43.8 FL (ref 39–50)
EOSINOPHIL # BLD: 0.1 K/MCL (ref 0–0.5)
EOSINOPHIL NFR BLD: 1 %
ERYTHROCYTE [DISTWIDTH] IN BLOOD: 12.6 % (ref 11–15)
ETHANOL SERPL-MCNC: 143 MG/DL
FASTING DURATION TIME PATIENT: ABNORMAL H
GFR SERPLBLD BASED ON 1.73 SQ M-ARVRAT: >90 ML/MIN
GLUCOSE SERPL-MCNC: 109 MG/DL (ref 70–99)
HCG SERPL QL: NEGATIVE
HCT VFR BLD CALC: 44.3 % (ref 36–46.5)
HGB BLD-MCNC: 14.7 G/DL (ref 12–15.5)
IMM GRANULOCYTES # BLD AUTO: 0.1 K/MCL (ref 0–0.2)
IMM GRANULOCYTES # BLD: 1 %
LYMPHOCYTES # BLD: 5 K/MCL (ref 1–4.8)
LYMPHOCYTES NFR BLD: 50 %
MCH RBC QN AUTO: 31.4 PG (ref 26–34)
MCHC RBC AUTO-ENTMCNC: 33.2 G/DL (ref 32–36.5)
MCV RBC AUTO: 94.7 FL (ref 78–100)
MONOCYTES # BLD: 0.7 K/MCL (ref 0.3–0.9)
MONOCYTES NFR BLD: 8 %
NEUTROPHILS # BLD: 3.7 K/MCL (ref 1.8–7.7)
NEUTROPHILS NFR BLD: 39 %
NRBC BLD MANUAL-RTO: 0 /100 WBC
PLATELET # BLD AUTO: 361 K/MCL (ref 140–450)
POTASSIUM SERPL-SCNC: 3.8 MMOL/L (ref 3.4–5.1)
RBC # BLD: 4.68 MIL/MCL (ref 4–5.2)
SODIUM SERPL-SCNC: 139 MMOL/L (ref 135–145)
WBC # BLD: 9.6 K/MCL (ref 4.2–11)

## 2022-01-07 PROCEDURE — 84703 CHORIONIC GONADOTROPIN ASSAY: CPT | Performed by: EMERGENCY MEDICINE

## 2022-01-07 PROCEDURE — 36415 COLL VENOUS BLD VENIPUNCTURE: CPT

## 2022-01-07 PROCEDURE — 80048 BASIC METABOLIC PNL TOTAL CA: CPT | Performed by: EMERGENCY MEDICINE

## 2022-01-07 PROCEDURE — 99284 EMERGENCY DEPT VISIT MOD MDM: CPT

## 2022-01-07 PROCEDURE — 73060 X-RAY EXAM OF HUMERUS: CPT

## 2022-01-07 PROCEDURE — 73080 X-RAY EXAM OF ELBOW: CPT

## 2022-01-07 PROCEDURE — 71045 X-RAY EXAM CHEST 1 VIEW: CPT

## 2022-01-07 PROCEDURE — 82077 ASSAY SPEC XCP UR&BREATH IA: CPT | Performed by: EMERGENCY MEDICINE

## 2022-01-07 PROCEDURE — 73030 X-RAY EXAM OF SHOULDER: CPT

## 2022-01-07 PROCEDURE — 85025 COMPLETE CBC W/AUTO DIFF WBC: CPT | Performed by: EMERGENCY MEDICINE

## 2022-01-07 PROCEDURE — 10004651 HB RX, NO CHARGE ITEM: Performed by: EMERGENCY MEDICINE

## 2022-01-07 RX ORDER — ACETAMINOPHEN 500 MG
1000 TABLET ORAL ONCE
Status: COMPLETED | OUTPATIENT
Start: 2022-01-07 | End: 2022-01-07

## 2022-01-07 RX ADMIN — ACETAMINOPHEN 1000 MG: 500 TABLET ORAL at 20:42

## 2022-01-08 LAB
RAINBOW EXTRA TUBES HOLD SPECIMEN: NORMAL

## 2022-07-21 NOTE — ED NOTES
Pt states she usually gets this pain every start of her period. And it happens in a monthly basis for the last 3 years. no

## 2022-12-16 ENCOUNTER — HOSPITAL ENCOUNTER (EMERGENCY)
Facility: HOSPITAL | Age: 32
Discharge: HOME | End: 2022-12-16
Attending: EMERGENCY MEDICINE
Payer: MEDICAID

## 2022-12-16 VITALS
OXYGEN SATURATION: 99 % | DIASTOLIC BLOOD PRESSURE: 86 MMHG | BODY MASS INDEX: 21 KG/M2 | SYSTOLIC BLOOD PRESSURE: 125 MMHG | HEART RATE: 89 BPM | WEIGHT: 120 LBS

## 2022-12-16 DIAGNOSIS — U07.1 COVID-19: Primary | ICD-10-CM

## 2022-12-16 DIAGNOSIS — Z20.2 STD EXPOSURE: ICD-10-CM

## 2022-12-16 LAB
B-HCG UR QL: NEGATIVE
SARS-COV-2 RNA RESP QL NAA+PROBE: DETECTED

## 2022-12-16 PROCEDURE — 87591 N.GONORRHOEAE DNA AMP PROB: CPT | Performed by: EMERGENCY MEDICINE

## 2022-12-16 PROCEDURE — 99284 EMERGENCY DEPT VISIT MOD MDM: CPT

## 2022-12-16 PROCEDURE — 87491 CHLMYD TRACH DNA AMP PROBE: CPT | Performed by: EMERGENCY MEDICINE

## 2022-12-16 PROCEDURE — 87660 TRICHOMONAS VAGIN DIR PROBE: CPT | Performed by: EMERGENCY MEDICINE

## 2022-12-16 PROCEDURE — 81025 URINE PREGNANCY TEST: CPT

## 2022-12-16 PROCEDURE — 87480 CANDIDA DNA DIR PROBE: CPT | Performed by: EMERGENCY MEDICINE

## 2022-12-16 PROCEDURE — 87510 GARDNER VAG DNA DIR PROBE: CPT | Performed by: EMERGENCY MEDICINE

## 2022-12-16 PROCEDURE — 96372 THER/PROPH/DIAG INJ SC/IM: CPT

## 2022-12-16 RX ORDER — DOXYCYCLINE HYCLATE 100 MG/1
100 CAPSULE ORAL 2 TIMES DAILY
Qty: 14 CAPSULE | Refills: 0 | Status: SHIPPED | OUTPATIENT
Start: 2022-12-16 | End: 2022-12-23

## 2022-12-16 RX ORDER — NIFEDIPINE 30 MG/1
TABLET, FILM COATED, EXTENDED RELEASE ORAL
COMMUNITY

## 2022-12-16 RX ORDER — ERGOCALCIFEROL (VITAMIN D2) 1250 MCG
CAPSULE ORAL
COMMUNITY

## 2022-12-16 RX ORDER — LISINOPRIL AND HYDROCHLOROTHIAZIDE 25; 20 MG/1; MG/1
TABLET ORAL
COMMUNITY

## 2022-12-16 RX ORDER — IBUPROFEN 600 MG/1
TABLET ORAL
COMMUNITY
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
## 2022-12-16 NOTE — DISCHARGE INSTRUCTIONS
Always use condoms  You were treated for gonorrhea and chlamydia today your cultures are pending. Rocephin was given in the emergency room for gonorrhea. You need to get doxycycline at the pharmacy to treat chlamydia. Do not have sexual intercourse and to you and your partner treated and you have your test results  You have COVID-19 and should quarantine for 5 days.   Return if worse  Follow-up with your primary care physician 1 week, sooner if not improving

## 2022-12-19 LAB
C TRACH DNA SPEC QL NAA+PROBE: POSITIVE
N GONORRHOEA DNA SPEC QL NAA+PROBE: NEGATIVE

## 2023-02-19 ENCOUNTER — APPOINTMENT (OUTPATIENT)
Dept: CT IMAGING | Facility: HOSPITAL | Age: 33
End: 2023-02-19
Attending: EMERGENCY MEDICINE
Payer: MEDICAID

## 2023-02-19 ENCOUNTER — HOSPITAL ENCOUNTER (EMERGENCY)
Facility: HOSPITAL | Age: 33
Discharge: HOME | End: 2023-02-19
Attending: EMERGENCY MEDICINE
Payer: MEDICAID

## 2023-02-19 VITALS
DIASTOLIC BLOOD PRESSURE: 120 MMHG | TEMPERATURE: 97 F | HEART RATE: 102 BPM | OXYGEN SATURATION: 100 % | SYSTOLIC BLOOD PRESSURE: 184 MMHG | HEIGHT: 64 IN | WEIGHT: 105 LBS | BODY MASS INDEX: 17.93 KG/M2 | RESPIRATION RATE: 18 BRPM

## 2023-02-19 DIAGNOSIS — I10 HYPERTENSION, UNSPECIFIED TYPE: ICD-10-CM

## 2023-02-19 DIAGNOSIS — S09.8XXA BLUNT HEAD TRAUMA, INITIAL ENCOUNTER: Primary | ICD-10-CM

## 2023-02-19 DIAGNOSIS — S00.01XA ABRASION OF SCALP, INITIAL ENCOUNTER: ICD-10-CM

## 2023-02-19 PROCEDURE — 99284 EMERGENCY DEPT VISIT MOD MDM: CPT

## 2023-02-19 PROCEDURE — 70450 CT HEAD/BRAIN W/O DYE: CPT | Performed by: EMERGENCY MEDICINE

## 2023-02-19 RX ORDER — IBUPROFEN 600 MG/1
600 TABLET ORAL ONCE
Status: COMPLETED | OUTPATIENT
Start: 2023-02-19 | End: 2023-02-19

## 2023-02-20 NOTE — DISCHARGE INSTRUCTIONS
Keep a record of your blood pressure daily for 1 week and follow-up with your physician for further care
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
## 2025-03-15 NOTE — ED NOTES
Back from ct department. Comfortable. No vomiting. States her pain is better.
Discharge instructions were reviewed and pt verbalized understanding. Pt is AxOx4 and ambulatory with steady gait.
Pt moaning in pain at this time.
Pt transported to ct department.
Opt out

## (undated) NOTE — ED AVS SNAPSHOT
[facility] Emergency Department    [place]  [address] [id number]    Phone:  [phone]    Fax:  [address] [facility]   MRN: [id number]    Department:  [facility] Emergency Department   Date of Visit:  1/16 IF THERE IS ANY CHANGE OR WORSENING OF YOUR CONDITION, CALL YOUR PRIMARY CARE PHYSICIAN AT ONCE OR RETURN IMMEDIATELY TO THE EMERGENCY DEPARTMENT.     If you have been prescribed any medication(s), please fill your prescription right away and begin taking t

## (undated) NOTE — ED AVS SNAPSHOT
Alma Nielson   MRN: [id number]    Department:  [facility] Emergency Department   Date of Visit:  10/28/2018           Disclosure     Insurance plans vary and the physician(s) referred by the ER may not be covered by your plan.  Please contact yo tell this physician (or your personal doctor if your instructions are to return to your personal doctor) about any new or lasting problems. The primary care or specialist physician will see patients referred from the [facility] Emergency Department.  Karley Diamond

## (undated) NOTE — ED AVS SNAPSHOT
Eunice Celaya   MRN: [id number]    Department:  [facility] Emergency Department   Date of Visit:  9/1/2017           Disclosure     Insurance plans vary and the physician(s) referred by the ER may not be covered by your plan.  Please contact your If you have been prescribed any medication(s), please fill your prescription right away and begin taking the medication(s) as directed    If the emergency physician has read X-rays, these will be re-interpreted by a radiologist.  If there is a significant

## (undated) NOTE — ED AVS SNAPSHOT
Rui Ambrocio   MRN: [id number]    Department:  [facility] Emergency Department   Date of Visit:  7/5/2018           Disclosure     Insurance plans vary and the physician(s) referred by the ER may not be covered by your plan.  Please contact your tell this physician (or your personal doctor if your instructions are to return to your personal doctor) about any new or lasting problems. The primary care or specialist physician will see patients referred from the [facility] Emergency Department.  Severo Pastures

## (undated) NOTE — ED AVS SNAPSHOT
Yady Araujo   MRN: [id number]    Department:  [facility] Emergency Department   Date of Visit:  6/11/2019           Disclosure     Insurance plans vary and the physician(s) referred by the ER may not be covered by your plan.  Please contact you tell this physician (or your personal doctor if your instructions are to return to your personal doctor) about any new or lasting problems. The primary care or specialist physician will see patients referred from the [facility] Emergency Department.  Jonel Harrison

## (undated) NOTE — ED AVS SNAPSHOT
Aster [facility]   MRN: [id number]    Department:  [facility] Emergency Department   Date of Visit:  3/12/2018           Disclosure     Insurance plans vary and the physician(s) referred by the ER may not be covered by your plan.  Please contact you tell this physician (or your personal doctor if your instructions are to return to your personal doctor) about any new or lasting problems. The primary care or specialist physician will see patients referred from the [facility] Emergency Department.  Daniel Herrmann

## (undated) NOTE — ED AVS SNAPSHOT
Lori Choudhary   MRN: [id number]    Department:  [facility] Emergency Department   Date of Visit:  6/8/2018           Disclosure     Insurance plans vary and the physician(s) referred by the ER may not be covered by your plan.  Please contact your tell this physician (or your personal doctor if your instructions are to return to your personal doctor) about any new or lasting problems. The primary care or specialist physician will see patients referred from the [facility] Emergency Department.  Tristan Reyes

## (undated) NOTE — LETTER
Date & Time: 7/5/2018, 6:42 PM  Patient: Aster Tana  Encounter Provider(s):    Shraddha Centeno MD       To Whom It May Concern:    Alexandria Best was seen and treated in our department on 7/5/2018. She should not return to work until 7/9/18.     If yo

## (undated) NOTE — ED AVS SNAPSHOT
Aster [facility]   MRN: [id number]    Department:  [facility] Emergency Department   Date of Visit:  6/10/2018           Disclosure     Insurance plans vary and the physician(s) referred by the ER may not be covered by your plan.  Please contact you tell this physician (or your personal doctor if your instructions are to return to your personal doctor) about any new or lasting problems. The primary care or specialist physician will see patients referred from the [facility] Emergency Department.  Wing Bautista

## (undated) NOTE — ED AVS SNAPSHOT
[facility] Emergency Department    [place]  [address] [address]    Phone:  [phone]    Fax:  [address] [place] [address]   MRN: [id number]    Department:  [facility] Emergency Department   Date of Visit:  1/16 Where to Get Your Medications      You can get these medications from any pharmacy     Bring a paper prescription for each of these medications    - ondansetron 4 MG Tbdp            Discharge References/Attachments     ABDOMINAL PAIN, UNKNOWN CAUSE, (FEMAL IF THERE IS ANY CHANGE OR WORSENING OF YOUR CONDITION, CALL YOUR PRIMARY CARE PHYSICIAN AT ONCE OR RETURN IMMEDIATELY TO THE EMERGENCY DEPARTMENT.     If you have been prescribed any medication(s), please fill your prescription right away and begin taking t Patient [address] to help you get signed up for insurance coverage. Patient [address] is a Federal Navigator program that can help with your Affordable Care Act coverage, as well as all types of Medicaid plans.   To get signed up and covere PANCREAS:  Normal.  No lesion, fluid collection, ductal dilatation, or atrophy. SPLEEN:  Normal.  No enlargement or focal lesion. 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  Nonobstructing 2 mm calculus in the mid left kidney i

## (undated) NOTE — ED AVS SNAPSHOT
Shar Andrea   MRN: [id number]    Department:  [facility] Emergency Department   Date of Visit:  3/16/2018           Disclosure     Insurance plans vary and the physician(s) referred by the ER may not be covered by your plan.  Please contact you tell this physician (or your personal doctor if your instructions are to return to your personal doctor) about any new or lasting problems. The primary care or specialist physician will see patients referred from the [facility] Emergency Department.  Cheryle Levins

## (undated) NOTE — ED AVS SNAPSHOT
[facility] Emergency Department    [place]  [address] [place] [address]    Phone:  [phone]    Fax:  [address] [facility]   MRN: [id number]    Department:  [facility] Emergency Department   Date of Visit:  6/5/ IF THERE IS ANY CHANGE OR WORSENING OF YOUR CONDITION, CALL YOUR PRIMARY CARE PHYSICIAN AT ONCE OR RETURN IMMEDIATELY TO THE EMERGENCY DEPARTMENT.     If you have been prescribed any medication(s), please fill your prescription right away and begin taking t

## (undated) NOTE — ED AVS SNAPSHOT
Yomi Farrell   MRN: [id number]    Department:  [facility] Emergency Department   Date of Visit:  11/21/2017           Disclosure     Insurance plans vary and the physician(s) referred by the ER may not be covered by your plan.  Please contact yo tell this physician (or your personal doctor if your instructions are to return to your personal doctor) about any new or lasting problems. The primary care or specialist physician will see patients referred from the [facility] Emergency Department.  Bassam Valderrama

## (undated) NOTE — ED AVS SNAPSHOT
Andrea Banks   MRN: [id number]    Department:  [facility] Emergency Department   Date of Visit:  12/21/2018           Disclosure     Insurance plans vary and the physician(s) referred by the ER may not be covered by your plan.  Please contact yo tell this physician (or your personal doctor if your instructions are to return to your personal doctor) about any new or lasting problems. The primary care or specialist physician will see patients referred from the [facility] Emergency Department.  Jonel Harrison

## (undated) NOTE — ED AVS SNAPSHOT
[facility] Emergency Department    [place]  [address] [place] [place] [address]    Phone:  [phone]    Fax:  [address] [facility]   MRN: [id number]    Department:  [facility] Emergency Department   Date of Visit:  6/5/ Medication List      START taking these medications     HYDROcodone-acetaminophen 5-325 MG Tabs   Quantity:  20 tablet   Commonly known as:  NORCO   Take 1 tablet by mouth every 6 (six) hours as needed for Pain.             Where to Get Your Medications Expect to receive an electronic request (by e-mail or text) to complete a self-assessment the day after your visit. You may also receive a call from our patient liason soon after your visit.  Also, some patients receive a detailed feedback survey mailed to [place] [address] [address]  ([address]) [address] [facility] [phone] [address] [facility] [address] ([address] [place] [address]) [phone] [address] [address] [address] ( PATIENT STATED HISTORY: (As transcribed by Technologist)  Pt. with sever abdominal pain, nausea and vomiting since Friday. FINDINGS:    BOWEL GAS PATTERN:  Nonobstructive bowel gas pattern. There is no evidence of free air.   CALCIFICATIONS:  None LEFT OVARY:  The left ovary appears normal in size, shape, and echogenicity. No significant masses are identified. Small follicles noted. CUL-DE-SAC:  Negative. DOPPLER WAVE FORMS  FLOW:  There is normal arterial and venous Doppler wave forms.   The Your unique MD.Voice Access Code: [email or web address]  Expires: 8/4/2017  9:18 PM    If you have questions, you can call [phone] to talk to our [facility] Staff. Remember, MD.Voice is NOT to be used for urgent needs. For medical emergencies, dial 911.